# Patient Record
Sex: FEMALE | Race: WHITE | NOT HISPANIC OR LATINO | Employment: FULL TIME | URBAN - METROPOLITAN AREA
[De-identification: names, ages, dates, MRNs, and addresses within clinical notes are randomized per-mention and may not be internally consistent; named-entity substitution may affect disease eponyms.]

---

## 2020-05-12 ENCOUNTER — CONVERSION ENCOUNTER (OUTPATIENT)
Dept: CARDIOLOGY | Facility: CLINIC | Age: 40
End: 2020-05-12
Attending: SPECIALIST

## 2020-05-27 ENCOUNTER — HOSPITAL ENCOUNTER (OUTPATIENT)
Dept: OTHER | Facility: HOSPITAL | Age: 40
Discharge: HOME | End: 2020-05-27
Attending: NURSE PRACTITIONER

## 2020-05-27 LAB
CREAT BLD-MCNC: 0.9 MG/DL (ref 0.6–1.4)
GFR SERPLBLD BASED ON 1.73 SQ M-ARVRAT: >60 ML/MIN/{1.73_M2}

## 2020-06-17 ENCOUNTER — TELEMEDICINE CONVERTED (OUTPATIENT)
Dept: UROLOGY | Facility: CLINIC | Age: 40
End: 2020-06-17
Attending: UROLOGY

## 2020-06-23 ENCOUNTER — OFFICE VISIT CONVERTED (OUTPATIENT)
Dept: CARDIOLOGY | Facility: CLINIC | Age: 40
End: 2020-06-23
Attending: SPECIALIST

## 2020-06-23 ENCOUNTER — CONVERSION ENCOUNTER (OUTPATIENT)
Dept: CARDIOLOGY | Facility: CLINIC | Age: 40
End: 2020-06-23

## 2020-06-24 ENCOUNTER — HOSPITAL ENCOUNTER (OUTPATIENT)
Dept: NUCLEAR MEDICINE | Facility: HOSPITAL | Age: 40
Discharge: HOME | End: 2020-06-24
Attending: UROLOGY

## 2020-07-01 ENCOUNTER — CONVERSION ENCOUNTER (OUTPATIENT)
Dept: CARDIOLOGY | Facility: CLINIC | Age: 40
End: 2020-07-01
Attending: SPECIALIST

## 2020-07-02 ENCOUNTER — TELEPHONE CONVERTED (OUTPATIENT)
Dept: UROLOGY | Facility: CLINIC | Age: 40
End: 2020-07-02
Attending: UROLOGY

## 2021-04-27 ENCOUNTER — OFFICE VISIT CONVERTED (OUTPATIENT)
Dept: CARDIOLOGY | Facility: CLINIC | Age: 41
End: 2021-04-27
Attending: SPECIALIST

## 2021-05-10 NOTE — PROCEDURES
Procedure Note      Patient Name: Verona Meade   Patient ID: [id number]   Sex: Female   YOB: 1980    Referring Provider: Caren DEL CASTILLO    Visit Date: May 12, 2020    Provider: Christian Ma MD   Location: [facility]   Location Address: [address] [place] [address]  [id number]   Location Phone: [phone]          FINAL REPORT   HOLTER MONITOR REPORT  Date: 05/07/2020   Indication: Palpitations      The patient was monitored for a period of 24 hours.  The total number of beats was 127,277 with an average rate of 88.  The maximum beats per minute was 125 with a minimum beats per minute of 76.  There was underlying sinus rhythm, and occasional PVCs.      CONCLUSION:  24-hour Holter monitor reveals occasional PVCs with no significant tachy- or bradyarrhythmias.      MD CAS Samay[place]/jc    This note was transcribed by Melissa Prakash.  jc/CAS  The above service was transcribed by Melissa Prakash, and I attest to the accuracy of the note.  CAS                 Electronically Signed by: Stefanie Prakash-, Other -Author on May 13, 2020 07:57:48 AM  Electronically Co-signed by: Christian Ma MD -Reviewer on May 13, 2020 12:09:33 PM

## 2021-05-10 NOTE — PROCEDURES
"   Procedure Note      Patient Name: Verona Meade   Patient ID: [id number]   Sex: Female   YOB: 1980    Primary Care Provider: Caren DEL CASTILLO   Referring Provider: Caren DEL CASTILLO    Visit Date: July 1, 2020    Provider: Christian Ma MD   Location: [facility]   Location Address: [address] [place] [address], [address]   [place]  [id number]   Location Phone: [phone]          FINAL REPORT   TRANSTHORACIC ECHOCARDIOGRAM REPORT    Diagnosis: Palpitations   Height: 5'5\" Weight: 257 B/P: 112/70 BSA: 2.2   Tech: BNS   MEASUREMENTS:  RVID (Diastole) : RVID. (NORMAL: 0.7 to 2.4 cm max)   LVID (Systole): 3.4 cm (Diastole): 4.8 cm . (NORMAL: 3.7 - 5.4 cm)   Posterior Wall Thickness (Diastole): 0.8 cm. (NORMAL: 0.8 - 1.1 cm)   Septal Thickness (Diastole): 0.9 cm. (NORMAL: 0.7 - 1.2 cm)   LAID (Systole): 3.3 cm. (NORMAL: 1.9 - 3.8 cm)   Aortic Root Diameter (Diastole): 3.0 cm. (NORMAL: 2.0 - 3.7 cm)   DOPPLER:  E/A ratio 1.1 (NORMAL 0.8-2.0)   DT: 222 msec (NORMAL 140-240 msec.)   IVRT 79 m/sec (NORMAL  m/sec.)   E/E': 7 (NORMAL <8 avg.)   COMMENTS:  The patient underwent 2-D, M-Mode, and Doppler examination, including pulse-wave, continuous-wave, and color-flow analysis; the study is technically adequate. The following was observed:   FINDINGS:  AORTIC VALVE: Normal. Tricuspid in appearance with normal central closure.   MITRAL VALVE: Normal. Bicuspid in appearance.   TRICUSPID VALVE: Normal.   PULMONIC VALVE: Not well visualized.   LEFT ATRIUM: Normal. No intracavitary masses or clots seen. LA volume index is 13 mL/m2.   AORTIC ROOT: Normal in size with adequate motion.   LEFT VENTRICLE: Normal left ventricular systolic function. Ejection fraction 64%.   RIGHT ATRIUM: Normal.   RIGHT VENTRICLE: Normal size and function.   PERICARDIUM: Unremarkable. No evidence of effusion.   INFERIOR VENA CAVA: Diameter is 1.9 cm.   DOPPLER: Doppler examination of the aortic, mitral, " tricuspid, and pulmonary valves was performed. Normal pulmonary artery systolic pressure by Doppler.   Faxed: 07/02/2020      CONCLUSION:  1.  Normal left ventricular systolic function.   2.  No significant valvular abnormalities.       MD CAS [place]/jc    This note was transcribed by Melissa Prakash.  jc/CAS  The above service was transcribed by Melissa Prakash, and I attest to the accuracy of the note.  CAS                 Electronically Signed by: Stefanie Prakash-, Other -Author on July 2, 2020 11:05:00 AM  Electronically Co-signed by: Christian Ma MD -Reviewer on July 13, 2020 08:46:09 AM

## 2021-05-10 NOTE — H&P
History and Physical      Patient Name: Verona Meade   Patient ID: [id number]   Sex: Female   YOB: 1980    Primary Care Provider: Caren DEL CASTILLO   Referring Provider: Caren DEL CASTILLO    Visit Date: June 23, 2020    Provider: Christian Ma MD   Location: [facility]   Location Address: [address] [place] [address], [address]   [place]  [id number]   Location Phone: [phone]          Chief Complaint  · Palpitations       History Of Present Illness  Consult requested by: Caren DEL CASTILLO   Verona Meade is a 39 year old /White female with a history of palpitations on and off for the last few months. She has shortness of breath on exertion. A 24-hour Holter was done, which showed episodes of tachycardia. She was started on Metoprolol and feels better. No chest pain. No syncopal or presyncopal episodes.   PAST MEDICAL HISTORY: Positive for hypertension. Negative for diabetes mellitus or hyperlipidemia.   FAMILY HISTORY: Positive for diabetes and hypertension. Negative for heart disease.   PSYCHOSOCIAL HISTORY: Positive for mood changes and depression. She rarely drinks alcohol and never used tobacco.   CURRENT MEDICATIONS: include Metoprolol ER 50 mg daily; Lisinopril 10 mg daily; Cymbalta 60 mg daily. The dosage and frequency of the medications were reviewed with the patient.   ALLERGIES: No known drug allergies.   CHOLESTEROL STATUS: Unknown.       Review of Systems  · Constitutional  o Admits  o : fatigue, good general health lately  o Denies  o : recent weight changes   · Eyes  o Denies  o : double vision  · HENT  o Denies  o : hearing loss or ringing, chronic sinus problem, swollen glands in neck  · Cardiovascular  o Admits  o : palpitations (fast, fluttering, or skipping beats), shortness of breath while walking or lying flat  o Denies  o : chest pain, swelling (feet, ankles, hands)  · Respiratory  o Denies  o : chronic or frequent cough, asthma or  "wheezing, COPD  · Gastrointestinal  o Denies  o : ulcers, nausea or vomiting  · Neurologic  o Admits  o : headaches  o Denies  o : lightheaded or dizzy, stroke  · Musculoskeletal  o Denies  o : joint pain, back pain  · Endocrine  o Denies  o : thyroid disease, diabetes, heat or cold intolerance, excessive thirst or urination  · Heme-Lymph  o Denies  o : bleeding or bruising tendency, anemia      Vitals  Date Time BP Position Site L\R Cuff Size HR RR TEMP (F) WT  HT  BMI kg/m2 BSA m2 O2 Sat HC       06/23/2020 12:41 /70 Sitting    83 - R   257lbs 0oz 5'  5\" 42.77 2.31           Physical Examination  · Constitutional  o Appearance  o : Awake, alert, cooperative, pleasant.  · Eyes  o Pupils and Irises  o : Pupils equal and reacting to light and accommodation.  · Ears, Nose, Mouth and Throat  o Ears  o : Tympanic membranes are normal.  o Nose  o : Clear with no maxillary tenderness.  o Oral Cavity  o : Clear.  · Neck  o Inspection/Palpation  o : No JVD, bruits, thyromegaly, or adenopathy. Trachea midline. No axillary or cervical lymphadenopathy.  o Thyroid  o : No thyroid enlargement.   · Respiratory  o Inspection of Chest  o : No chest wall deformities, moving equal.  o Auscultation of Lungs  o : Good air entry with vesicular breath sounds.  o Percussion of Chest  o : Resonant bilaterally.   o Palpation of Chest  o : Equal movements bilaterally.  · Cardiovascular  o Heart  o :   § Auscultation of Heart  § : PMI is in the 5th intercostal space. S1 and S2 regular. No S3. No S4. No murmurs.  o Peripheral Vascular System  o :   § Extremities  § : No pedal edema. Peripheral pulses well felt. No cyanosis.  · Gastrointestinal  o Abdominal Examination  o : No organomegaly, masses, tenderness, bruits, or abnormal pulsations. Bowel sounds are normal.  · Musculoskeletal  o General  o : Muscle strength is normal with normal tone.  · Skin and Subcutaneous Tissue  o General Inspection  o : No rash, petechiae, or suspicious " skin lesions. Skin turgor is normal.          Assessment     ASSESSMENT AND PLAN:    1.  Palpitations, tachycardia:  Agree with Metoprolol ER 50 mg once a day.  Will review her 24-hour Holter.  Will       do an echocardiogram to evaluate left ventricular systolic function.  2.  Essential hypertension controlled:  Continue Lisinopril.    Christian Ma MD, [facility]  CAS/eleni           This note was transcribed by Alessandra Ashton.  eleni/CAS  The above service was transcribed by Alessandra Ashton, and I attest to the accuracy of the note.  CAS               Electronically Signed by: Alessandra Ashton-, -Author on June 25, 2020 08:41:18 AM  Electronically Co-signed by: Christian Ma MD -Reviewer on June 30, 2020 08:33:08 AM

## 2021-05-13 NOTE — PROGRESS NOTES
Progress Note      Patient Name: Verona Meade   Patient ID: [id number]   Sex: Female   YOB: 1980    Primary Care Provider: Caren DEL CASTILLO   Referring Provider: Caren DEL CASTILLO    Visit Date: 2020    Provider: Dorothea Jordan MD   Location: [facility]   Location Address: [address] [place] [address]  [id number]   Location Phone: [phone]          Chief Complaint  · Pt is here for urological concerns     Video Conferencing Visit- presents for evaluation via video conferencing via Zoom.   Verbal consent obtained before beginning visit.   The following staff were present during this visit: Beata Rivera MA.         History Of Present Illness     39-year-old lady presents for further evaluation of left atrophic kidney.  She previously sought evaluation by PCP for shortness of air and tachycardia for which chest CT was obtained.  Incidentally noted on CT chest was left renal atrophy with renal scarring.  Patient states she was unaware of size discrepancy of her kidneys.    Denies left-sided flank pain.  Denies history of pyelonephritis or urinary tract infection.  Denies having UTIs as a child.  Patient does report history of chronic back pain, but no flank pain.  Denies history of nephrolithiasis or hematuria.      creatinine  2020: 0.96  2020: 1.11    CT chest with contrast, 2020: Below the hemidiaphragms, there is chronic scarring involving left kidney.  The left kidney is also atrophic       Past Medical History  Depression with anxiety; Heart rate fast; High blood pressure         Past Surgical History   section         Medication List  Cymbalta 60 mg oral capsule,delayed release(DR/EC); lisinopril 10 mg oral tablet; metoprolol succinate 50 mg oral tablet extended release 24 hr         Allergy List  NO KNOWN DRUG ALLERGIES       Allergies Reconciled  Family Medical History  *No Known Family History         Social History  Tobacco (Former)          Review of Systems  · Constitutional  o Denies  o : chills, fever  · Eyes  o Admits  o : Glasses or contacts  · Cardiovascular  o Denies  o : chest pain on exertion  · Respiratory  o Denies  o : shortness of breath  · Gastrointestinal  o Denies  o : nausea, vomiting, diarrhea  · Genitourinary  o Denies  o : blood in urine, kidney stones, trouble voiding  · Neurologic  o Denies  o : tingling or numbness  · Musculoskeletal  o Admits  o : joint pain  · Endocrine  o Denies  o : weight gain, weight loss  · Heme-Lymph  o Denies  o : easy bleeding, easy bruising      Physical Examination  · Constitutional  o Appearance  o : Well developed, well nourished, alert, in no acute distress.  · Head and Face  o Head  o :   § Inspection  § : Normocephalic, atraumatic  o Face  o :   § Inspection  § : No facial lesions  · Skin and Subcutaneous Tissue  o General Inspection  o : No rashes, lesions or areas of discoloration present. Skin turgor is normal.  · Neurologic  o Mental Status Examination  o :   § Orientation  § : alert and oriented x 3  · Psychiatric  o Mood and Affect  o : mood normal, affect appropriate          Results     [facility]      PACS RADIOLOGY REPORT    Patient: YULIA WADE Acct: #[id number] Report: #[id number]    UNIT #: [id number]  DOS: 20 1447  INSURANCE:[place] OUT OF STATE ORDER #:[id number]  LOCATION:[facility]   : 1980    PROVIDERS  ADMITTING:   ATTENDING: XIMENA MURO  FAMILY:  NONE,MD ORDERING:  XIMENA MURO   OTHER:  DICTATING:  ARIELLE LENTZ MD    REQ #:[id number]   EXAM:CHW - CT CHEST with CONTRAST  REASON FOR EXAM:  TACHYCARDIA SHORT OF BREATH  REASON FOR VISIT:  TACHYCARDIA SHORT OF BREATH    *******Signed******     PROCEDURE: CT CHEST W/ CONTRAST     COMPARISON: None.     INDICATIONS: TACHYCARDIA SHORT OF BREATH     TECHNIQUE: After obtaining the patient's consent, CT images were obtained with non-ionic   intravenous  contrast material.       PROTOCOL:   Standard imaging protocol performed      RADIATION:   DLP: 838mGy*cm    Automated exposure control was utilized to minimize radiation dose.   CONTRAST: 95cc Isovue 370 I.V.  LABS:   eGFR: 81ml/min/1.73m2     FINDINGS:   There are 2 noncalcified pulmonary nodules located in the superior aspect of the right middle lobe   on image 34 of series 3.  The largest nodule measures 3 mm in size.  The findings are of doubtful   clinical significance.  There is a calcified nodule in the left upper lobe on image 23 of series 3   consistent with a granuloma.  The remaining lungs and pleural spaces are clear.  There are no   enlarged lymph nodes.  The heart size is normal.  There is normal vascular enhancement.  There are   no layering pleural effusions.  Below the hemidiaphragms, there is chronic scarring involving the   left kidney.  The left kidney is also atrophic.  There are no suspicious osteolytic or sclerotic   lesions within the bony thorax.     CONCLUSION:   1. Two noncalcified pulmonary nodules in the superior aspect of the right middle lobe of doubtful   clinical significance.  I would recommend follow-up based on Fleischner society guidelines.  2. Left renal atrophy and chronic scarring.        The findings include multiple, incidentally detected, solid pulmonary nodules, measuring less than   6mm.  2017 guidelines from the Fleischner Society for the follow-up and management of incidentally   detected indeterminate pulmonary nodules in persons at least 35 years of age depend on nodule size   (average length and width) and underlying risk factors (including smoking and other risk factors).   Please consider the following recommendations after clinical assessment of risk factors.  For <6mm   solid nodules: In low risk patients, no follow-up required.  If suspicious morphology or upper lobe   location, consider 12 month follow-up.  In high risk patients, optional CT in 12 months.            ARIELLE LENTZ MD         Electronically Signed and Approved By: ARIELLE LENTZ MD on 5/27/2020 at 15:47            Until signed, this is an unconfirmed preliminary report that may contain  errors and is subject to change.                WORBR:  D:05/27/20 1547>       Assessment  · Atrophy, kidney     587/N26.1    Problems Reconciled  Plan  · Orders  o Renal Scan with Lasix [facility] ([id number]) - 587/N26.1 - 06/24/2020  o CT Abdomen and Pelvis without and with Contrast UROLOGY PROTOCOL (includes delayed imaging) [facility] ([id number]) - 587/N26.1 - 06/24/2020  · Medications  o Medications have been Reconciled  o Transition of Care or Provider Policy  · Instructions  o Electronically Identified Patient Education Materials Provided Electronically     Left renal atrophynoted incidentally on CT scan.  CT imaging reviewed and discussed with patient.  CT of chest does not provide complete evaluation of the kidneys and only captures to the mid poles.  Left renal atrophy noted on this incomplete evaluation however it is of unknown etiology or significance.    Discussed with patient that this is likely chronic and it is unknown at this point if this kidney is considered functioning.    Warrants further evaluation to assess viability and anatomy of her urinary system  Provided reassurance this patient's creatinine is normal    Discussed that intervention depending on work-up may be indicated in order to preserve any residual function of the left kidney however, also discussed, that this kidney may be nonfunctioning and if remains asymptomatic then no management is warranted.    Obtain MAG3 Lasix renal scan and CT abdomen/pelvis with/without/and delayed imaging  All questions addressed    Total time for encounter greater than 30 minutes due to review of records, counseling and coordination of care which dominated greater than 51% of the encounter.               Electronically Signed by: Dorothea Jordan MD -Author on June 19, 2020 07:40:14 AM

## 2021-05-13 NOTE — PROGRESS NOTES
Progress Note      Patient Name: Verona Meade   Patient ID: [id number]   Sex: Female   YOB: 1980    Primary Care Provider: Caren DEL CASTILLO   Referring Provider: Caren DEL CASTILLO    Visit Date: July 2, 2020    Provider: Dorothea Jordan MD   Location[facility]   Location Address: [address] [place] [address]  [id number]   Location Phone: [phone]          Chief Complaint  · Pt is here for urological concerns     Telephone Visit- presents for evaluation via telephone.   Verbal consent obtained before beginning visit.   The following staff were present during this visit: Beata Rivera MA  Total time for encounter: 11 minutes       History Of Present Illness     39-year-old lady presents for further evaluation of left atrophic kidney.  She previously sought evaluation by PCP for shortness of air and tachycardia for which chest CT was obtained.  Incidentally noted on CT chest was left renal atrophy with renal scarring.  Patient states she was unaware of size discrepancy of her kidneys.    Denies left-sided flank pain.  Denies history of pyelonephritis or urinary tract infection.  Denies having UTIs as a child.  Patient does report history of chronic back pain, but no flank pain.  Denies history of nephrolithiasis or hematuria.    Update 7/2/2020: Patient presents for follow-up after CT abdomen pelvis and MAG3 Lasix renal scan.  Patient denies significant changes since last visit.  Denies any flank pain or bothersome urinary symptoms.    creatinine  5/20/2020: 0.96  5/7/2020: 1.11    CT chest with contrast, 5/27/2020: Below the hemidiaphragms, there is chronic scarring involving left kidney.  The left kidney is also atrophic    CT abdomen pelvis, 6/24/2020: Moderate left renal cortical scarring.  Kidneys have symmetric enhancement and excretion no hydronephrosis.    MAG3 scan, 6/24/2020: Differential function left 40%: Right 60%; no evidence of obstructive uropathy           Past  Medical History  Atrophy, kidney; Depression with anxiety; Heart rate fast; High blood pressure; Renal scarring         Past Surgical History   section         Medication List  Cymbalta 60 mg oral capsule,delayed release(DR/EC); lisinopril 10 mg oral tablet; metoprolol succinate 50 mg oral tablet extended release 24 hr         Allergy List  NO KNOWN DRUG ALLERGIES       Allergies Reconciled  Family Medical History  *No Known Family History         Social History  Tobacco (Former)         Review of Systems  · Constitutional  o Denies  o : chills, fever  · Gastrointestinal  o Denies  o : nausea, vomiting, flank pain          Results     [facility]      PACS RADIOLOGY REPORT    Patient: YULIA WADE Acct: #[id number] Report: #[id number]    UNIT #: [id number]  DOS: 20 1308  INSURANCE:[employer] OUT OF STATE ORDER #:[id number]  LOCATION:NM   : 1980    PROVIDERS  ADMITTING:   ATTENDING: SIMON MIMS  FAMILY:  XIMENA MURO ORDERING:  SIMON MIMS   OTHER:  DICTATING:  Reyes Pang MD, IV    REQ #:[id number]   EXAM:ABDPELWOW - CT ABDOMEN PELVIS wwo CONTRAST  REASON FOR EXAM:  ATROPHY OF KIDNEY  REASON FOR VISIT:  ATROPHY OF KIDNEY    *******Signed******     PROCEDURE: CT ABDOMEN PELVIS WITHOUT AND WITH CONTRAST     COMPARISON: None.     INDICATIONS: ATROPHY OF KIDNEY     PROTOCOL:   Urology imaging protocol performed      RADIATION:   DLP: 3626mGy*cm    Automated exposure control was utilized to minimize radiation dose.   CONTRAST: 100cc Isovue 370 I.V.     TECHNIQUE: Axial images of the abdomen and pelvis with and without intravenous contrast. Oral   contrast was not administered.     FINDINGS:     ABDOMEN:  The lung bases are clear.  The liver, spleen, pancreas and adrenal glands are normal.  No renal   calcifications are identified.  There are no inflammatory changes around the gallbladder.  There is   moderate left renal cortical  "scarring.  The right kidney is normal.  No evidence of suspicious   renal mass or hydronephrosis.  The kidneys have symmetric enhancement and excretion.     PELVIS:  No ureteral or urinary bladder calcifications are identified.  The partially opacified urinary   bladder is normal.  The ureters have a normal course and caliber.  The uterus and adnexa have a   normal appearance.  The appendix and terminal ileum are normal.  No CT evidence of colitis.  The   abdominal aorta has a normal caliber.     IMPRESSION:  Moderate left renal cortical scarring.     JACKSON BAUTISTA MD         Electronically Signed and Approved By: JACKSON BAUTISTA MD on 2020 at 15:21            Until signed, this is an unconfirmed preliminary report that may contain  errors and is subject to change.                BRYJE:  D:20 1521        20 min.).      T 1/2 POST-LASIX (RIGHT): 0.87 minutes.  (Normal  20   min.).    BLADDER: Normal.    OTHER: Negative.       CONCLUSION: Some asymmetrically decreased left renal function, but no evidence of obstructive   uropathy.            JACKIE JONES MD         Electronically Signed and Approved By: JACKIE JONES MD on 2020 at 15:02            Until signed, this is an unconfirmed preliminary report that may contain  errors and is subject to change.                RODD1:  D:20 1502  \">[facility]      PACS RADIOLOGY REPORT    Patient: YULIA WADE Acct: #[id number] Report: #[id number]    UNIT #: [id number]  DOS: 20 1308  INSURANCE:[employer] OUT OF STATE ORDER #:[id number]  LOCATION:NM   : 1980    PROVIDERS  ADMITTING:   ATTENDING: SIMON MIMS  FAMILY:  XIMENA MURO ORDERING:  SIMON MIMS   OTHER:  DICTATING:  Jackie oJnes MD    REQ #:[id number]   EXAM:RENSL - RENAL SCAN With LASIX  REASON FOR EXAM:  ATROPHY OF KIDNEY  REASON FOR VISIT:  ATROPHY OF KIDNEY    *******Signed******     PROCEDURE: RENAL " WITH LASIX     COMPARISON: [facility], CT, ABDOMEN PELVIS W/WO CONTRAST, 6/24/2020, 14:25.    [facility], CT, CHEST W/ CONTRAST, 5/27/2020, 14:54.     INDICATIONS: ATROPHY OF KIDNEY     TECHNIQUE: After obtaining the patient's consent, a bolus intravenous injection of Tc-99m MAG-3 was   administered.  Sequential renal flow images were performed followed by sequential static images.    Quantitative analysis was performed of both kidneys. Subsequently, the patient was given   intravenous Lasix for determination of renal washout.    RADIONUCLIDE:         10.7 MCI    Tc99m Mag3 - I.V.     FINDINGS:   ARTERIAL PERFUSION: Normal and symmetric.    TIME TO PEAK (LEFT): Normal-less than five minutes.    TIME TO PEAK (RIGHT): Normal-less than five minutes.    UPTAKE/FUNCTION: Left 40 % : Right 60 %.    T 1/2 POST-LASIX (LEFT): 0.79 minutes.  (Normal < 10 min, equivocal 10-20 min, abnormal > 20 min.).      T 1/2 POST-LASIX (RIGHT): 0.87 minutes.  (Normal < 10 min, equivocal 10-20 min, abnormal > 20   min.).    BLADDER: Normal.    OTHER: Negative.       CONCLUSION: Some asymmetrically decreased left renal function, but no evidence of obstructive   uropathy.            JACKIE MCDONALD MD         Electronically Signed and Approved By: JACKIE MCDONALD MD on 6/24/2020 at 15:02            Until signed, this is an unconfirmed preliminary report that may contain  errors and is subject to change.                RODD1:  D:06/24/20 1502         Assessment  · Atrophy, kidney     587/N26.1  · Renal scarring     593.89/N28.89    Problems Reconciled  Plan  · Orders  o Physician Telephone Evaluation, 11-20 minutes (02928) - 587/N26.1, 593.89/N28.89 - 07/02/2020  · Medications  o Medications have been Reconciled  o Transition of Care or Provider Policy  · Instructions  o Electronically Identified Patient Education Materials Provided Electronically     Left renal atrophy with moderate renal scarringCT imaging  of MAG3 and reviewed and discussed with patient.  Despite left renal atrophy and scarring, it is a functioning kidney at 40%.  Has symmetric uptake of contrast on CT scan and is without evidence of obstructive uropathy.  No known etiology for the moderate renal scarring.  No acute intervention is warranted given these findings.     Obtain surveillance imaging via renal ultrasound in 1 year to assess for further atrophy    Follow-up 1 year with renal ultrasound prior  All questions addressed               Electronically Signed by: Dorothea Jordan MD -Author on July 2, 2020 07:00:41 PM

## 2021-05-14 VITALS
BODY MASS INDEX: 45.32 KG/M2 | HEIGHT: 65 IN | DIASTOLIC BLOOD PRESSURE: 74 MMHG | WEIGHT: 272 LBS | SYSTOLIC BLOOD PRESSURE: 118 MMHG | HEART RATE: 90 BPM

## 2021-05-14 NOTE — PROGRESS NOTES
"   Progress Note      Patient Name: Verona Meade   Patient ID: [id number]   Sex: Female   YOB: 1980    Primary Care Provider: Caren DEL CASTILLO   Referring Provider: Caren DEL CASTILLO    Visit Date: April 27, 2021    Provider: Christian Ma MD   Location: [facility]   Location Address: [address] [place] [address], [address]   [place]  [id number]   Location Phone: [phone]          Chief Complaint     Palpitations.       History Of Present Illness  Verona Meade is a 40 year old /White female with a history of palpitations. She has had some increased palpitations, mostly at night time, going on for the last few months. No syncopal or pre-syncopal episodes. No chest pain.   CURRENT MEDICATIONS: Medications have been reviewed and are as stated.   PAST MEDICAL HISTORY: Positive for hypertension. Negative for diabetes mellitus or hyperlipidemia.   PSYCHOSOCIAL HISTORY: Rarely consumes alcohol. Denies tobacco use.      ALLERGIES:  No known drug allergies.       Review of Systems  · Cardiovascular  o Admits  o : palpitations (fast, fluttering, or skipping beats), shortness of breath while walking or lying flat  o Denies  o : swelling (feet, ankles, hands), chest pain or angina pectoris   · Respiratory  o Denies  o : chronic or frequent cough      Vitals  Date Time BP Position Site L\R Cuff Size HR RR TEMP (F) WT  HT  BMI kg/m2 BSA m2 O2 Sat FR L/min FiO2 HC       04/27/2021 09:46 /74 Sitting    90 - R   272lbs 0oz 5'  5\" 45.26 2.38             Physical Examination  · Constitutional  o Appearance  o : Awake, alert, cooperative, pleasant.  · Neck  o Inspection/Palpation  o : No JVD.  · Respiratory  o Inspection of Chest  o : No chest wall deformities, moving equal.  o Auscultation of Lungs  o : Clear.  · Cardiovascular  o Heart  o :   § Auscultation of Heart  § : S1 and S2 regular. No S3. No S4.   o Peripheral Vascular System  o :   § Extremities  § : No edema. "   · Gastrointestinal  o Abdominal Examination  o : No masses or organomegaly noted.          Assessment     IMPRESSION AND PLAN:  1. Palpitations and tachycardia.  In view of her increased palpitations, will increase Metoprolol ER to 50 mg b.i.d.   2. Essential hypertension, controlled.  Continue on Lisinopril.  3. See me back in six months.      MD CAS [place]/lm                Electronically Signed by: Lisa Joseph-, Other -Author on April 29, 2021 01:20:08 PM  Electronically Co-signed by: Christian Ma MD -Reviewer on April 30, 2021 11:27:43 AM

## 2021-05-15 VITALS
BODY MASS INDEX: 42.82 KG/M2 | DIASTOLIC BLOOD PRESSURE: 70 MMHG | HEART RATE: 83 BPM | WEIGHT: 257 LBS | HEIGHT: 65 IN | SYSTOLIC BLOOD PRESSURE: 112 MMHG

## 2021-07-16 ENCOUNTER — TRANSCRIBE ORDERS (OUTPATIENT)
Dept: ADMINISTRATIVE | Facility: HOSPITAL | Age: 41
End: 2021-07-16

## 2021-07-16 DIAGNOSIS — R91.1 PULMONARY NODULE: ICD-10-CM

## 2021-07-16 DIAGNOSIS — R10.2 PELVIC PAIN: ICD-10-CM

## 2021-07-16 DIAGNOSIS — Z12.39 SCREENING BREAST EXAMINATION: Primary | ICD-10-CM

## 2021-07-23 ENCOUNTER — HOSPITAL ENCOUNTER (OUTPATIENT)
Dept: ULTRASOUND IMAGING | Facility: HOSPITAL | Age: 41
Discharge: HOME | End: 2021-07-23

## 2021-07-23 ENCOUNTER — HOSPITAL ENCOUNTER (OUTPATIENT)
Dept: CT IMAGING | Facility: HOSPITAL | Age: 41
Discharge: HOME | End: 2021-07-23

## 2021-07-23 DIAGNOSIS — R91.1 PULMONARY NODULE: ICD-10-CM

## 2021-07-23 DIAGNOSIS — R10.2 PELVIC PAIN: ICD-10-CM

## 2021-07-23 PROCEDURE — 71250 CT THORAX DX C-: CPT

## 2021-07-23 PROCEDURE — 76856 US EXAM PELVIC COMPLETE: CPT

## 2021-07-23 PROCEDURE — 71250 CT THORAX DX C-: CPT | Performed by: RADIOLOGY

## 2021-08-02 ENCOUNTER — HOSPITAL ENCOUNTER (OUTPATIENT)
Dept: MAMMOGRAPHY | Facility: HOSPITAL | Age: 41
Discharge: HOME | End: 2021-08-02
Admitting: NURSE PRACTITIONER

## 2021-08-02 DIAGNOSIS — Z12.39 SCREENING BREAST EXAMINATION: ICD-10-CM

## 2021-08-02 PROCEDURE — 77067 SCR MAMMO BI INCL CAD: CPT

## 2021-11-22 PROBLEM — I10 HYPERTENSION, ESSENTIAL: Status: ACTIVE | Noted: 2021-11-22

## 2021-11-22 PROBLEM — R00.2 PALPITATIONS: Status: ACTIVE | Noted: 2021-11-22

## 2021-11-22 NOTE — PROGRESS NOTES
[facility]  Cardiology progress Note    Patient Name: Verona Meade  : 1980    CHIEF COMPLAINT  Palpitations.      Subjective   Subjective     HISTORY OF PRESENT ILLNESS    Verona Meade is a 41 y.o. female with history of palpitations and tachycardia.  No palpitations.    Review of Systems:   Constitutional no fever,  no weight loss   Skin no rash   Otolaryngeal no difficulty swallowing   Cardiovascular See HPI   Pulmonary no cough, no sputum production   Gastrointestinal no constipation, no diarrhea   Genitourinary no dysuria, no hematuria   Hematologic no easy bruisability, no abnormal bleeding   Musculoskeletal no muscle pain   Neurologic no dizziness, no falls         Personal History     Social History:  reports that she quit smoking about 20 years ago. Her smoking use included cigarettes. She started smoking about 20 years ago. She smoked 0.50 packs per day. She has never used smokeless tobacco. She reports current alcohol use. She reports that she does not use drugs.    Home Medications:  Current Outpatient Medications on File Prior to Visit   Medication Sig   • buPROPion XL (WELLBUTRIN XL) 150 MG 24 hr tablet Take 150 mg by mouth Daily.   • DULoxetine (CYMBALTA) 60 MG capsule One po qod   • lisinopril (PRINIVIL,ZESTRIL) 10 MG tablet Take 10 mg by mouth Daily.   • [DISCONTINUED] metoprolol succinate XL (TOPROL-XL) 50 MG 24 hr tablet Take 50 mg by mouth 2 (Two) Times a Day.     No current facility-administered medications on file prior to visit.     Allergies:  No Known Allergies    Objective    Objective       Vitals:   Heart Rate:  [76] 76  BP: (114)/(75) 114/75  Body mass index is 45.43 kg/m².     Physical Exam:   Constitutional: Awake, alert, No acute distress    Eyes: PERRLA, sclerae anicteric, no conjunctival injection   HENT: NCAT, mucous membranes moist   Neck: Supple, no thyromegaly, no lymphadenopathy, trachea midline   Respiratory: Clear to auscultation bilaterally,  nonlabored respirations    Cardiovascular: RRR, no murmurs or rubs. Palpable pedal pulses bilaterally   Musculoskeletal: No bilateral ankle edema, no cyanosis to extremities   Psychiatric: Appropriate affect, cooperative   Neurologic: Oriented x 3, strength symmetric in all extremities, Cranial Nerves grossly intact to confrontation, speech clear   Skin: No rashes.    Result Review    Result Review:  I have personally reviewed the available results from  [x]  Laboratory  [x]  EKG  [x]  Cardiology  [x]  Medications  [x]  Old records  []  Other:     ECG 12 Lead    Date/Time: 11/23/2021 12:51 PM  Performed by: Christian Ma MD  Authorized by: Christian Ma MD   Comparison: compared with previous ECG   Similar to previous ECG  Rhythm: sinus rhythm  Rate: normal  QRS axis: normal    Clinical impression: normal ECG  Comments: Normal sinus rhythm.  No significant changes compared to previous EKG.            Impression/Plan:  1.  Palpitations/tachycardia: Continue Toprol-XL 50 mg twice daily.  2.  Essential hypertension controlled: Continue lisinopril 10 mg once a day.  Blood pressure well controlled at home.           Christian Ma MD   11/23/21   12:51 EST

## 2021-11-23 ENCOUNTER — OFFICE VISIT (OUTPATIENT)
Dept: CARDIOLOGY | Facility: CLINIC | Age: 41
End: 2021-11-23

## 2021-11-23 VITALS
BODY MASS INDEX: 45.48 KG/M2 | WEIGHT: 273 LBS | DIASTOLIC BLOOD PRESSURE: 75 MMHG | HEART RATE: 76 BPM | HEIGHT: 65 IN | SYSTOLIC BLOOD PRESSURE: 114 MMHG

## 2021-11-23 DIAGNOSIS — R00.2 PALPITATIONS: Primary | ICD-10-CM

## 2021-11-23 DIAGNOSIS — I10 HYPERTENSION, ESSENTIAL: ICD-10-CM

## 2021-11-23 PROCEDURE — 99213 OFFICE O/P EST LOW 20 MIN: CPT | Performed by: SPECIALIST

## 2021-11-23 PROCEDURE — 93000 ELECTROCARDIOGRAM COMPLETE: CPT | Performed by: SPECIALIST

## 2021-11-23 RX ORDER — LISINOPRIL 10 MG/1
10 TABLET ORAL DAILY
COMMUNITY
Start: 2021-11-07

## 2021-11-23 RX ORDER — METOPROLOL SUCCINATE 50 MG/1
50 TABLET, EXTENDED RELEASE ORAL 2 TIMES DAILY
Qty: 180 TABLET | Refills: 6 | Status: SHIPPED | OUTPATIENT
Start: 2021-11-23 | End: 2021-11-30

## 2021-11-23 RX ORDER — BUPROPION HYDROCHLORIDE 150 MG/1
150 TABLET ORAL DAILY
COMMUNITY
Start: 2021-11-15 | End: 2022-07-13

## 2021-11-23 RX ORDER — METOPROLOL SUCCINATE 50 MG/1
50 TABLET, EXTENDED RELEASE ORAL 2 TIMES DAILY
COMMUNITY
Start: 2021-11-07 | End: 2021-11-23 | Stop reason: SDUPTHER

## 2021-11-23 RX ORDER — DULOXETIN HYDROCHLORIDE 60 MG/1
CAPSULE, DELAYED RELEASE ORAL
COMMUNITY
Start: 2021-11-07 | End: 2022-07-13

## 2021-11-30 RX ORDER — METOPROLOL SUCCINATE 50 MG/1
TABLET, EXTENDED RELEASE ORAL
Qty: 180 TABLET | Refills: 3 | Status: SHIPPED | OUTPATIENT
Start: 2021-11-30 | End: 2022-07-13

## 2022-03-28 DIAGNOSIS — N28.89 RENAL SCARRING: ICD-10-CM

## 2022-03-28 DIAGNOSIS — N26.1 ATROPHY, KIDNEY: Primary | ICD-10-CM

## 2022-03-28 PROBLEM — F41.8 DEPRESSION WITH ANXIETY: Status: ACTIVE | Noted: 2022-03-28

## 2022-03-28 PROBLEM — R00.0 HEART RATE FAST: Status: ACTIVE | Noted: 2022-03-28

## 2022-04-06 ENCOUNTER — TELEPHONE (OUTPATIENT)
Dept: UROLOGY | Facility: CLINIC | Age: 42
End: 2022-04-06

## 2022-04-06 NOTE — TELEPHONE ENCOUNTER
spoke toVicki, in scheduling and got patient an appointment for DAVONTE for 4/15/22 at [facility], [address] [place] [address] , [place], [address]. patient must go with a full bladder. will send to scheduling to get them to call patient with follow up. Will have scheduling give information to patient about davonte when calling to schedule follow up.

## 2022-04-06 NOTE — TELEPHONE ENCOUNTER
----- Message from Alex Sim sent at 4/6/2022  2:51 PM EDT -----  PER PT SHE SAID SHE SPOKE WITH SOMEONE THIS MORNING, NOT SURE WHO AND LET THEM KNOW SHE WILL NOT HAVE INSURANCE FOR ABOUT 1 MONTH SHE SAID THAT SHE WOULD CALL BACK ONCE SHE GETS IT BACK TO SCHEDULE THE US AND F/U.    ----- Message -----  From: Taryn Reyes LPN  Sent: 4/6/2022   2:42 PM EDT  To: Alex Read    got patient an appointment for ANDREW for 4/15/22 at [facility], [address] [place] [address] , [place], [address]. patient must go with a full bladder. give information to patient about andrew when calling to schedule follow up. Please and thank you.

## 2022-04-15 ENCOUNTER — APPOINTMENT (OUTPATIENT)
Dept: ULTRASOUND IMAGING | Facility: HOSPITAL | Age: 42
End: 2022-04-15

## 2022-07-13 ENCOUNTER — OFFICE VISIT (OUTPATIENT)
Dept: CARDIOLOGY | Facility: CLINIC | Age: 42
End: 2022-07-13

## 2022-07-13 VITALS
BODY MASS INDEX: 41.79 KG/M2 | SYSTOLIC BLOOD PRESSURE: 113 MMHG | HEART RATE: 72 BPM | DIASTOLIC BLOOD PRESSURE: 85 MMHG | WEIGHT: 250.8 LBS | HEIGHT: 65 IN

## 2022-07-13 DIAGNOSIS — R42 DIZZINESS: ICD-10-CM

## 2022-07-13 DIAGNOSIS — R00.2 PALPITATIONS: Primary | ICD-10-CM

## 2022-07-13 DIAGNOSIS — I10 HYPERTENSION, ESSENTIAL: ICD-10-CM

## 2022-07-13 PROBLEM — R00.0 HEART RATE FAST: Status: RESOLVED | Noted: 2022-03-28 | Resolved: 2022-07-13

## 2022-07-13 PROCEDURE — 93000 ELECTROCARDIOGRAM COMPLETE: CPT | Performed by: NURSE PRACTITIONER

## 2022-07-13 PROCEDURE — 99214 OFFICE O/P EST MOD 30 MIN: CPT | Performed by: NURSE PRACTITIONER

## 2022-07-13 RX ORDER — METOPROLOL TARTRATE 50 MG/1
TABLET, FILM COATED ORAL
Qty: 245 TABLET | Refills: 3 | Status: SHIPPED | OUTPATIENT
Start: 2022-07-13 | End: 2023-02-21 | Stop reason: DRUGHIGH

## 2022-07-13 RX ORDER — TRAZODONE HYDROCHLORIDE 50 MG/1
50 TABLET ORAL NIGHTLY
COMMUNITY
Start: 2022-05-20 | End: 2023-02-19

## 2022-07-13 RX ORDER — DESVENLAFAXINE SUCCINATE 50 MG/1
50 TABLET, EXTENDED RELEASE ORAL DAILY
COMMUNITY
Start: 2022-06-12 | End: 2023-02-19

## 2022-07-13 RX ORDER — DESVENLAFAXINE 25 MG/1
25 TABLET, EXTENDED RELEASE ORAL DAILY
COMMUNITY
Start: 2022-06-12 | End: 2023-02-19

## 2022-07-13 RX ORDER — HYDROXYZINE HYDROCHLORIDE 25 MG/1
25 TABLET, FILM COATED ORAL DAILY
COMMUNITY
Start: 2022-05-20 | End: 2023-02-19

## 2022-07-13 NOTE — PROGRESS NOTES
Chief Complaint  Hypertension, Palpitations, and Dizziness    Subjective            History of Present Illness  Karishma Meade is a 42-year-old white/ female patient who presents to the office today for follow-up.  She has hypertension and palpitations.  She reports compliance with her medications.  She reports that she occasionally has increased palpitations with associated lightheadedness and dizziness approximately 1-2 times a month.  She denies any chest pain, shortness of breath, or edema.    PMH  Past Medical History:   Diagnosis Date   • Deep vein thrombosis (HCC)    • Hypertension          ALLERGY  No Known Allergies       SURGICALHX  History reviewed. No pertinent surgical history.       SOC  Social History     Socioeconomic History   • Marital status:    Tobacco Use   • Smoking status: Former Smoker     Packs/day: 0.50     Types: Cigarettes     Start date:      Quit date: 2001     Years since quittin.6   • Smokeless tobacco: Never Used   Vaping Use   • Vaping Use: Never used   Substance and Sexual Activity   • Alcohol use: Yes     Comment: occassionally   • Drug use: Never   • Sexual activity: Defer         FAMHX  Family History   Problem Relation Age of Onset   • Thyroid disease Mother    • Thyroid disease Father    • Diabetes Father    • Anemia Father    • Heart disease Paternal Grandmother    • Cancer Paternal Grandmother    • Heart disease Paternal Grandfather    • Cancer Paternal Grandfather           MEDSIGONLY  Current Outpatient Medications on File Prior to Visit   Medication Sig   • desvenlafaxine (PRISTIQ) 50 MG 24 hr tablet 50 mg Daily.   • Desvenlafaxine Succinate ER 25 MG tablet sustained-release 24 hour 25 mg Daily.   • hydrOXYzine (ATARAX) 25 MG tablet 25 mg Daily.   • lisinopril (PRINIVIL,ZESTRIL) 10 MG tablet Take 10 mg by mouth Daily.   • metoprolol succinate XL (TOPROL-XL) 50 MG 24 hr tablet TAKE 1 TABLET BY MOUTH TWICE DAILY   • traZODone (DESYREL) 50 MG  "tablet 50 mg Every Night.   • [DISCONTINUED] buPROPion XL (WELLBUTRIN XL) 150 MG 24 hr tablet Take 150 mg by mouth Daily.   • [DISCONTINUED] DULoxetine (CYMBALTA) 60 MG capsule One po qod     No current facility-administered medications on file prior to visit.         Objective   /85   Pulse 72   Ht 165.1 cm (65\")   Wt 114 kg (250 lb 12.8 oz)   BMI 41.74 kg/m²       Physical Exam  Constitutional:       Appearance: She is obese.   HENT:      Head: Normocephalic.   Neck:      Vascular: No carotid bruit.   Cardiovascular:      Rate and Rhythm: Normal rate and regular rhythm.      Pulses: Normal pulses.      Heart sounds: Normal heart sounds. No murmur heard.  Pulmonary:      Effort: Pulmonary effort is normal.      Breath sounds: Normal breath sounds.   Musculoskeletal:      Cervical back: Neck supple.      Right lower leg: No edema.      Left lower leg: No edema.   Skin:     General: Skin is dry.      Capillary Refill: Capillary refill takes less than 2 seconds.   Neurological:      Mental Status: She is alert and oriented to person, place, and time.   Psychiatric:         Behavior: Behavior normal.       ECG 12 Lead    Date/Time: 7/13/2022 3:05 PM  Performed by: Niyah Gee APRN  Authorized by: Niyah Gee APRN   Comparison: compared with previous ECG   Similar to previous ECG  Rhythm: sinus rhythm  Ectopy: atrial premature contractions  Rate: normal  BPM: 72  Conduction: conduction normal  ST Segments: ST segments normal  T Waves: T waves normal  QRS axis: normal  Other: no other findings    Clinical impression: normal ECG          Result Review :   The following data was reviewed by: ABUNDIO Leggett on 07/13/2022:  No results found for: PROBNP       No results found for: TSH   No results found for: FREET4   No results found for: DDIMERQUANT  No results found for: MG   No results found for: DIGOXIN   No results found for: TROPONINT        07/01/2020 echo:  1.  Normal left " ventricular systolic function.   2.  No significant valvular abnormalities.       05/12/2020 holter:  24-hour Holter monitor reveals occasional PVCs with no significant tachy- or bradyarrhythmias.     Assessment and Plan    Diagnoses and all orders for this visit:    1. Palpitations (Primary)  She had previous 24-hour monitor that only showed occasional PVCs.  Instructed patient to limit her caffeine intake.  Change her metoprolol dose to 50 mg 2 times a day and extra 25 mg dose nightly as needed for increased palpitations or elevated heart rate.    2. Hypertension, essential  Currently controlled and without adverse effect from medication, continue lisinopril 10 mg daily.    3. Dizziness  New development of dizziness which is pretty infrequent in nature, only occurring monthly.  Obtain duplex carotid ultrasound to assess for carotid stenosis.  -     Duplex Carotid Ultrasound CAR; Future    Other orders  -     metoprolol tartrate (LOPRESSOR) 50 MG tablet; Take 1 tablet by mouth 2 (Two) Times a Day. May also take 0.5 tablets At Night As Needed (palpitations/elevated heart rate).  Dispense: 245 tablet; Refill: 3  -     ECG 12 Lead            Follow Up   Return in about 6 months (around 1/13/2023) for Follow up with Dr Ma.    Patient was given instructions and counseling regarding her condition or for health maintenance advice. Please see specific information pulled into the AVS if appropriate.     Karishma JANAY Meade  reports that she quit smoking about 20 years ago. Her smoking use included cigarettes. She started smoking about 21 years ago. She smoked 0.50 packs per day. She has never used smokeless tobacco.           Niyah Gee, ABUNDIO  07/13/22  15:13 EDT    Dictated Utilizing Dragon Dictation

## 2022-08-30 ENCOUNTER — TELEPHONE (OUTPATIENT)
Dept: CARDIOLOGY | Facility: CLINIC | Age: 42
End: 2022-08-30

## 2022-08-30 NOTE — TELEPHONE ENCOUNTER
----- Message from ABUNDIO Zhao sent at 8/30/2022  1:18 PM EDT -----  Notify pt carotid result: Carotid Doppler examination shows bilateral plaquing.  There is no significant carotid stenosis.

## 2023-02-19 NOTE — PROGRESS NOTES
[facility]  Cardiology progress Note    Patient Name: Karishma Meade  : 1980    CHIEF COMPLAINT  Palpitations        Subjective   Subjective     HISTORY OF PRESENT ILLNESS    Karishma Meade is a 42 y.o. female with history of palpitations hypertension.  No further palpitations.    REVIEW OF SYSTEMS    Constitutional:    No fever, no weight loss  Skin:     No rash  Otolaryngeal:    No difficulty swallowing  Cardiovascular: See HPI.  Pulmonary:    No cough, no sputum production    Personal History     Social History:    reports that she quit smoking about 21 years ago. Her smoking use included cigarettes. She started smoking about 22 years ago. She has a 0.50 pack-year smoking history. She has never used smokeless tobacco. She reports current alcohol use of about 1.0 standard drink per week. She reports that she does not use drugs.    Home Medications:  Current Outpatient Medications on File Prior to Visit   Medication Sig   • lisinopril (PRINIVIL,ZESTRIL) 10 MG tablet Take 10 mg by mouth Daily.   • pantoprazole (PROTONIX) 40 MG EC tablet Take 1 tablet by mouth Daily.   • [DISCONTINUED] metoprolol succinate XL (TOPROL-XL) 50 MG 24 hr tablet Take 1 tablet by mouth 2 (Two) Times a Day.   • [DISCONTINUED] metoprolol tartrate (LOPRESSOR) 50 MG tablet Take 1 tablet by mouth 2 (Two) Times a Day. May also take 0.5 tablets At Night As Needed (palpitations/elevated heart rate).     No current facility-administered medications on file prior to visit.       Past Medical History:   Diagnosis Date   • Deep vein thrombosis (HCC)    • Hypertension        Allergies:  No Known Allergies    Objective    Objective       Vitals:   Heart Rate:  [74] 74  BP: (102)/(72) 102/72  Body mass index is 39.11 kg/m².     PHYSICAL EXAM:    General Appearance:   · well developed  · well nourished  HENT:   · oropharynx moist  · lips not cyanotic  Neck:  · thyroid not enlarged  · supple  Respiratory:  · no respiratory  distress  · normal breath sounds  · no rales  Cardiovascular:  · no jugular venous distention  · regular rhythm  · apical impulse normal  · S1 normal, S2 normal  · no S3, no S4   · no murmur  · no rub, no thrill  · carotid pulses normal; no bruit  · pedal pulses normal  · lower extremity edema: none    Skin:   · warm, dry  Psychiatric:  · judgement and insight appropriate  · normal mood and affect        Result Review:  I have personally reviewed the available results from  [x]  Laboratory  [x]  EKG  [x]  Cardiology  [x]  Medications  [x]  Old records  []  Other:     Procedures     Impression/Plan:  1.  Essential hypertension controlled: Continue lisinopril 10 mg once a day.  Monitor blood pressure regularly.  2.  Palpitations/tachycardia: Continue Toprol-XL 50 mg twice a day.  No further palpitations.           Christian Ma MD   02/21/23   14:32 EST

## 2023-02-21 ENCOUNTER — OFFICE VISIT (OUTPATIENT)
Dept: CARDIOLOGY | Facility: CLINIC | Age: 43
End: 2023-02-21
Payer: COMMERCIAL

## 2023-02-21 VITALS
HEART RATE: 74 BPM | BODY MASS INDEX: 39.15 KG/M2 | WEIGHT: 235 LBS | DIASTOLIC BLOOD PRESSURE: 72 MMHG | HEIGHT: 65 IN | SYSTOLIC BLOOD PRESSURE: 102 MMHG

## 2023-02-21 DIAGNOSIS — I10 HYPERTENSION, ESSENTIAL: Primary | ICD-10-CM

## 2023-02-21 DIAGNOSIS — R00.2 PALPITATIONS: ICD-10-CM

## 2023-02-21 PROCEDURE — 99213 OFFICE O/P EST LOW 20 MIN: CPT | Performed by: SPECIALIST

## 2023-02-21 RX ORDER — PANTOPRAZOLE SODIUM 40 MG/1
1 TABLET, DELAYED RELEASE ORAL DAILY
COMMUNITY
Start: 2023-01-27

## 2023-02-21 RX ORDER — METOPROLOL SUCCINATE 50 MG/1
1 TABLET, EXTENDED RELEASE ORAL 2 TIMES DAILY
COMMUNITY
Start: 2023-01-27 | End: 2023-02-21 | Stop reason: SDUPTHER

## 2023-02-21 RX ORDER — METOPROLOL SUCCINATE 50 MG/1
50 TABLET, EXTENDED RELEASE ORAL 2 TIMES DAILY
Qty: 90 TABLET | Refills: 6 | Status: SHIPPED | OUTPATIENT
Start: 2023-02-21

## 2023-03-29 ENCOUNTER — TRANSCRIBE ORDERS (OUTPATIENT)
Dept: ADMINISTRATIVE | Facility: HOSPITAL | Age: 43
End: 2023-03-29
Payer: COMMERCIAL

## 2023-03-29 DIAGNOSIS — Z12.31 SCREENING MAMMOGRAM FOR BREAST CANCER: Primary | ICD-10-CM

## 2023-04-03 ENCOUNTER — TRANSCRIBE ORDERS (OUTPATIENT)
Dept: ADMINISTRATIVE | Facility: HOSPITAL | Age: 43
End: 2023-04-03
Payer: COMMERCIAL

## 2023-04-03 DIAGNOSIS — N26.1 ATROPHY OF KIDNEY: Primary | ICD-10-CM

## 2023-04-19 ENCOUNTER — HOSPITAL ENCOUNTER (OUTPATIENT)
Dept: ULTRASOUND IMAGING | Facility: HOSPITAL | Age: 43
Discharge: HOME | End: 2023-04-19
Admitting: FAMILY MEDICINE
Payer: COMMERCIAL

## 2023-04-19 DIAGNOSIS — N26.1 ATROPHY OF KIDNEY: ICD-10-CM

## 2023-04-19 PROCEDURE — 76775 US EXAM ABDO BACK WALL LIM: CPT

## 2023-05-03 ENCOUNTER — HOSPITAL ENCOUNTER (OUTPATIENT)
Dept: MAMMOGRAPHY | Facility: HOSPITAL | Age: 43
Discharge: HOME | End: 2023-05-03
Admitting: FAMILY MEDICINE
Payer: COMMERCIAL

## 2023-05-03 DIAGNOSIS — Z12.31 SCREENING MAMMOGRAM FOR BREAST CANCER: ICD-10-CM

## 2023-05-03 PROCEDURE — 77067 SCR MAMMO BI INCL CAD: CPT

## 2023-05-03 PROCEDURE — 77063 BREAST TOMOSYNTHESIS BI: CPT

## 2023-05-03 NOTE — PROGRESS NOTES
Chief Complaint: kidney Atrohy    Subjective         History of Present Illness  Karishma Meade is a 42 y.o. female presents to [facility] to be seen for kidney atrophy.    Patient was previously seen by Dr. Dorothea Flores with last visit being 7/2/2020 for atrophic left kidney.    Patient reports that her original finding of atrophic kidney was found incidentally during a chest CT.     Denies recurrent UTI.     Frequency- admits- but reports she has recently increased her water intake    Urgency- admits    Incontinence- with urgency, some with cough/laugh/sneeze    Nocturia- 3-4    GH- denies     surgeries- denies    Family history of  malignancy- denies    Cardiopulmonary- HTN, irregular heartbeat    Anticoagulants- denies    Smoker- denies      Renal ultrasound:  INDICATIONS:  RENAL ATROPHY     FINDINGS:          The right kidney measures about 11.7 centimeters in length.  Left kidney measures 9.7 centimeters   in length.  Right kidney demonstrates no significant hydronephrosis.  No renal lesions are seen.    Cortical echogenicity appears to be normal.  Left kidney demonstrates no significant   hydronephrosis.  There is cortical thinning especially in the interpolar region compatible with   scarring.  No mass evident.  The exam is under penetrated.  Visualized bladder appears within   normal limits.     IMPRESSION:                 1. Redemonstration lobulated cortical thinning involving left kidney as seen on previous CT   6/24/2020 suggestive of scarring.  No acute findings are seen.  The exam is under penetrated in   this finding is better evaluated on previous CT.               FRANCISCA BELTRAN MD         Electronically Signed and Approved By: FRANCISCA BELTRAN MD on 4/19/2023 at 15:25         Previous 7/2/2020:  39-year-old lady presents for further evaluation of left atrophic kidney.  She previously sought evaluation by PCP for shortness of air and tachycardia for which  chest CT was obtained.  Incidentally noted on CT chest was left renal atrophy with renal scarring.  Patient states she was unaware of size discrepancy of her kidneys.    Denies left-sided flank pain.  Denies history of pyelonephritis or urinary tract infection.  Denies having UTIs as a child.  Patient does report history of chronic back pain, but no flank pain.  Denies history of nephrolithiasis or hematuria.    Update 2020: Patient presents for follow-up after CT abdomen pelvis and MAG3 Lasix renal scan.  Patient denies significant changes since last visit.  Denies any flank pain or bothersome urinary symptoms.    creatinine  2020: 0.96  2020: 1.11    CT chest with contrast, 2020: Below the hemidiaphragms, there is chronic scarring involving left kidney.  The left kidney is also atrophic    CT abdomen pelvis, 2020: Moderate left renal cortical scarring.  Kidneys have symmetric enhancement and excretion no hydronephrosis.    MAG3 scan, 2020: Differential function left 40%: Right 60%; no evidence of obstructive uropathy     Left renal atrophy with moderate renal scarringCT imaging of MAG3 and reviewed and discussed with patient.  Despite left renal atrophy and scarring, it is a functioning kidney at 40%.  Has symmetric uptake of contrast on CT scan and is without evidence of obstructive uropathy.  No known etiology for the moderate renal scarring.  No acute intervention is warranted given these findings.     Obtain surveillance imaging via renal ultrasound in 1 year to assess for further atrophy    Follow-up 1 year with renal ultrasound prior    Objective     Past Medical History:   Diagnosis Date   • Deep vein thrombosis    • Hypertension        Past Surgical History:   Procedure Laterality Date   •  SECTION           Current Outpatient Medications:   •  lisinopril (PRINIVIL,ZESTRIL) 10 MG tablet, Take 1 tablet by mouth Daily., Disp: , Rfl:   •  metoprolol succinate XL (TOPROL-XL)  "50 MG 24 hr tablet, Take 1 tablet by mouth 2 (Two) Times a Day., Disp: 90 tablet, Rfl: 6  •  pantoprazole (PROTONIX) 40 MG EC tablet, Take 1 tablet by mouth Daily., Disp: , Rfl:     No Known Allergies     Family History   Problem Relation Age of Onset   • Thyroid disease Mother    • Thyroid disease Father    • Diabetes Father    • Anemia Father    • Heart disease Paternal Grandmother    • Cancer Paternal Grandmother    • Heart disease Paternal Grandfather    • Cancer Paternal Grandfather        Social History     Socioeconomic History   • Marital status:    Tobacco Use   • Smoking status: Former     Packs/day: 0.50     Years: 1.00     Pack years: 0.50     Types: Cigarettes     Start date: 2001     Quit date: 2001     Years since quittin.4     Passive exposure: Past   • Smokeless tobacco: Never   Vaping Use   • Vaping Use: Never used   Substance and Sexual Activity   • Alcohol use: Yes     Alcohol/week: 1.0 standard drink     Types: 1 Glasses of wine per week     Comment: occassionally   • Drug use: Never   • Sexual activity: Yes     Partners: Male     Birth control/protection: Tubal ligation       Vital Signs:   Resp 18   Ht 165.1 cm (65\")   Wt 97.2 kg (214 lb 3.2 oz)   BMI 35.64 kg/m²      Physical Exam  Vitals reviewed.   Constitutional:       Appearance: Normal appearance.   Neurological:      General: No focal deficit present.      Mental Status: She is alert and oriented to person, place, and time.   Psychiatric:         Mood and Affect: Mood normal.         Behavior: Behavior normal.          Result Review :   The following data was reviewed by: ABUNDIO Bonilla on 2023:           Procedures        Assessment and Plan    Diagnoses and all orders for this visit:    1. Atrophy, kidney (Primary)  -     Basic Metabolic Panel; Future  -     US Renal Bilateral; Future    2. Renal scarring  -     Basic Metabolic Panel; Future  -     US Renal Bilateral; Future    3. Urinary " frequency    Given that I have no kidney function studies to review, I have ordered a BMP to ensure that her kidneys are still functioning well.  We will plan to have her have a renal ultrasound in 1 year to ensure no further changes in the atrophic kidney.  She was advised that if she starts to develop sudden onset of urinary tract infections or other urinary symptoms that she should definitely call sooner for an appointment.    She is not currently interested in treatment for overactive bladder since she feels that this is directly related to her increasing her fluid intake recently.  She has stopped drinking teas and has been increasing her water intake for her health.  We did discuss that if this changes and the overactive bladder symptoms become more problematic and she would like to be treated she can let us know.      Follow Up   Return in about 1 year (around 5/12/2024) for with renal ultrasound.  Patient was given instructions and counseling regarding her condition or for health maintenance advice. Please see specific information pulled into the AVS if appropriate.         This document has been electronically signed by ABUNDIO Bonilla  May 12, 2023 14:51 EDT

## 2023-05-12 ENCOUNTER — OFFICE VISIT (OUTPATIENT)
Dept: UROLOGY | Facility: CLINIC | Age: 43
End: 2023-05-12
Payer: COMMERCIAL

## 2023-05-12 ENCOUNTER — LAB (OUTPATIENT)
Dept: LAB | Facility: HOSPITAL | Age: 43
End: 2023-05-12
Payer: COMMERCIAL

## 2023-05-12 VITALS — RESPIRATION RATE: 18 BRPM | WEIGHT: 214.2 LBS | HEIGHT: 65 IN | BODY MASS INDEX: 35.69 KG/M2

## 2023-05-12 DIAGNOSIS — R35.0 URINARY FREQUENCY: ICD-10-CM

## 2023-05-12 DIAGNOSIS — N28.89 RENAL SCARRING: ICD-10-CM

## 2023-05-12 DIAGNOSIS — N26.1 ATROPHY, KIDNEY: Primary | ICD-10-CM

## 2023-05-12 DIAGNOSIS — N26.1 ATROPHY, KIDNEY: ICD-10-CM

## 2023-05-12 LAB
ANION GAP SERPL CALCULATED.3IONS-SCNC: 7 MMOL/L (ref 5–15)
BUN SERPL-MCNC: 20 MG/DL (ref 6–20)
BUN/CREAT SERPL: 17.7 (ref 7–25)
CALCIUM SPEC-SCNC: 8.7 MG/DL (ref 8.6–10.5)
CHLORIDE SERPL-SCNC: 105 MMOL/L (ref 98–107)
CO2 SERPL-SCNC: 27 MMOL/L (ref 22–29)
CREAT SERPL-MCNC: 1.13 MG/DL (ref 0.57–1)
EGFRCR SERPLBLD CKD-EPI 2021: 62.8 ML/MIN/1.73
GLUCOSE SERPL-MCNC: 82 MG/DL (ref 65–99)
POTASSIUM SERPL-SCNC: 4.1 MMOL/L (ref 3.5–5.2)
SODIUM SERPL-SCNC: 139 MMOL/L (ref 136–145)

## 2023-05-12 PROCEDURE — 80048 BASIC METABOLIC PNL TOTAL CA: CPT

## 2023-05-12 PROCEDURE — 36415 COLL VENOUS BLD VENIPUNCTURE: CPT

## 2023-05-15 DIAGNOSIS — N26.1 ATROPHY, KIDNEY: Primary | ICD-10-CM

## 2023-05-26 ENCOUNTER — LAB (OUTPATIENT)
Dept: LAB | Facility: HOSPITAL | Age: 43
End: 2023-05-26
Payer: COMMERCIAL

## 2023-05-26 DIAGNOSIS — N26.1 ATROPHY, KIDNEY: ICD-10-CM

## 2023-05-26 LAB
ANION GAP SERPL CALCULATED.3IONS-SCNC: 10.5 MMOL/L (ref 5–15)
BUN SERPL-MCNC: 14 MG/DL (ref 6–20)
BUN/CREAT SERPL: 14.7 (ref 7–25)
CALCIUM SPEC-SCNC: 9.5 MG/DL (ref 8.6–10.5)
CHLORIDE SERPL-SCNC: 106 MMOL/L (ref 98–107)
CO2 SERPL-SCNC: 24.5 MMOL/L (ref 22–29)
CREAT SERPL-MCNC: 0.95 MG/DL (ref 0.57–1)
EGFRCR SERPLBLD CKD-EPI 2021: 77.4 ML/MIN/1.73
GLUCOSE SERPL-MCNC: 86 MG/DL (ref 65–99)
POTASSIUM SERPL-SCNC: 4.3 MMOL/L (ref 3.5–5.2)
SODIUM SERPL-SCNC: 141 MMOL/L (ref 136–145)

## 2023-05-26 PROCEDURE — 36415 COLL VENOUS BLD VENIPUNCTURE: CPT

## 2023-05-26 PROCEDURE — 80048 BASIC METABOLIC PNL TOTAL CA: CPT

## 2023-05-30 ENCOUNTER — TELEPHONE (OUTPATIENT)
Dept: UROLOGY | Facility: CLINIC | Age: 43
End: 2023-05-30

## 2023-09-24 NOTE — PROGRESS NOTES
[facility]  Cardiology progress Note    Patient Name: Karishma Meade  : 1980    CHIEF COMPLAINT  Palpitations        Subjective   Subjective     HISTORY OF PRESENT ILLNESS    Karishma Meade is a 43 y.o. female with history of palpitations and hypertension.  She has some palpitations off and on.  Occasional dizziness.    REVIEW OF SYSTEMS    Constitutional:    No fever, no weight loss  Skin:     No rash  Otolaryngeal:    No difficulty swallowing  Cardiovascular: See HPI.  Pulmonary:    No cough, no sputum production    Personal History     Social History:    reports that she quit smoking about 21 years ago. Her smoking use included cigarettes. She started smoking about 22 years ago. She has a 0.50 pack-year smoking history. She has been exposed to tobacco smoke. She has never used smokeless tobacco. She reports current alcohol use of about 1.0 standard drink per week. She reports that she does not use drugs.    Home Medications:  Current Outpatient Medications on File Prior to Visit   Medication Sig    metoprolol succinate XL (TOPROL-XL) 50 MG 24 hr tablet Take 1 tablet by mouth 2 (Two) Times a Day.    pantoprazole (PROTONIX) 40 MG EC tablet Take 1 tablet by mouth Daily.    [DISCONTINUED] lisinopril (PRINIVIL,ZESTRIL) 10 MG tablet Take 1 tablet by mouth Daily.     No current facility-administered medications on file prior to visit.       Past Medical History:   Diagnosis Date    Deep vein thrombosis     Hypertension        Allergies:  No Known Allergies    Objective    Objective       Vitals:   Heart Rate:  [77] 77  BP: (107)/(73) 107/73  Body mass index is 39.44 kg/m².     PHYSICAL EXAM:    General Appearance:   well developed  well nourished  HENT:   oropharynx moist  lips not cyanotic  Neck:  thyroid not enlarged  supple  Respiratory:  no respiratory distress  normal breath sounds  no rales  Cardiovascular:  no jugular venous distention  regular rhythm  apical impulse normal  S1 normal, S2  normal  no S3, no S4   no murmur  no rub, no thrill  carotid pulses normal; no bruit  pedal pulses normal  lower extremity edema: none    Skin:   warm, dry  Psychiatric:  judgement and insight appropriate  normal mood and affect        Result Review:  I have personally reviewed the available results from  [x]  Laboratory  [x]  EKG  [x]  Cardiology  [x]  Medications  [x]  Old records  []  Other:     Procedures       Impression/Plan:  1.  Essential hypertension controlled: Decrease lisinopril to 5 mg once a day.  Monitor blood pressure regularly.  2.  Palpitations/tachycardia: Continue Toprol-XL 50 mg twice a day.  Repeat 48-hour Holter monitor in view of her palpitations.           Christian Ma MD   09/26/23   14:18 EDT

## 2023-09-26 ENCOUNTER — OFFICE VISIT (OUTPATIENT)
Dept: CARDIOLOGY | Facility: CLINIC | Age: 43
End: 2023-09-26
Payer: COMMERCIAL

## 2023-09-26 VITALS
BODY MASS INDEX: 39.49 KG/M2 | SYSTOLIC BLOOD PRESSURE: 107 MMHG | HEART RATE: 77 BPM | WEIGHT: 237 LBS | HEIGHT: 65 IN | DIASTOLIC BLOOD PRESSURE: 73 MMHG

## 2023-09-26 DIAGNOSIS — I10 HYPERTENSION, ESSENTIAL: Primary | ICD-10-CM

## 2023-09-26 DIAGNOSIS — R00.2 PALPITATIONS: ICD-10-CM

## 2023-09-26 PROCEDURE — 99214 OFFICE O/P EST MOD 30 MIN: CPT | Performed by: SPECIALIST

## 2023-09-26 RX ORDER — LISINOPRIL 5 MG/1
5 TABLET ORAL DAILY
Qty: 90 TABLET | Refills: 5 | Status: SHIPPED | OUTPATIENT
Start: 2023-09-26

## 2023-11-03 ENCOUNTER — TELEPHONE (OUTPATIENT)
Dept: CARDIOLOGY | Facility: CLINIC | Age: 43
End: 2023-11-03
Payer: COMMERCIAL

## 2023-11-03 NOTE — TELEPHONE ENCOUNTER
----- Message from ABUNDIO Zhao sent at 11/3/2023  2:43 PM EDT -----  Notify pt holter result:  ·  Lowest heart rate was 56 bpm, average heart rate was 90 bpm, maximum heart rate was 143 bpm.  ·  Rare PAC and PVC, can be felt as palpitations, limit caffeine intake.  ·  4 patient activated events.  3 of these corresponded to sinus tachycardia.  1 of these corresponded to PVCs.  ·  No significant abnormal heart rhythms noted.  Continue current metoprolol dose, may take extra dose as needed for palpitations. Continue with echo on 01/30/24 and follow up as scheduled

## 2023-11-27 RX ORDER — METOPROLOL SUCCINATE 50 MG/1
50 TABLET, EXTENDED RELEASE ORAL 2 TIMES DAILY
Qty: 180 TABLET | Refills: 3 | Status: SHIPPED | OUTPATIENT
Start: 2023-11-27

## 2024-02-13 ENCOUNTER — HOSPITAL ENCOUNTER (OUTPATIENT)
Dept: CARDIOLOGY | Facility: HOSPITAL | Age: 44
Discharge: HOME | End: 2024-02-13
Admitting: SPECIALIST
Payer: COMMERCIAL

## 2024-02-13 DIAGNOSIS — R00.2 PALPITATIONS: ICD-10-CM

## 2024-02-13 LAB
BH CV ECHO MEAS - AO MAX PG: 9 MMHG
BH CV ECHO MEAS - AO MEAN PG: 5 MMHG
BH CV ECHO MEAS - AO ROOT DIAM: 3.3 CM
BH CV ECHO MEAS - AO V2 MAX: 152 CM/SEC
BH CV ECHO MEAS - AO V2 VTI: 33.3 CM
BH CV ECHO MEAS - AVA(I,D): 2.1 CM2
BH CV ECHO MEAS - EDV(CUBED): 85.2 ML
BH CV ECHO MEAS - EDV(MOD-SP2): 86 ML
BH CV ECHO MEAS - EDV(MOD-SP4): 73.6 ML
BH CV ECHO MEAS - EF(MOD-BP): 59.3 %
BH CV ECHO MEAS - EF(MOD-SP2): 66 %
BH CV ECHO MEAS - EF(MOD-SP4): 56.9 %
BH CV ECHO MEAS - ESV(CUBED): 24.4 ML
BH CV ECHO MEAS - ESV(MOD-SP2): 29.2 ML
BH CV ECHO MEAS - ESV(MOD-SP4): 31.7 ML
BH CV ECHO MEAS - FS: 34.1 %
BH CV ECHO MEAS - IVS/LVPW: 1 CM
BH CV ECHO MEAS - IVSD: 1 CM
BH CV ECHO MEAS - LA DIMENSION: 3.8 CM
BH CV ECHO MEAS - LAT PEAK E' VEL: 18.6 CM/SEC
BH CV ECHO MEAS - LV DIASTOLIC VOL/BSA (35-75): 34.4 CM2
BH CV ECHO MEAS - LV MASS(C)D: 147.8 GRAMS
BH CV ECHO MEAS - LV MAX PG: 4.2 MMHG
BH CV ECHO MEAS - LV MEAN PG: 2 MMHG
BH CV ECHO MEAS - LV SYSTOLIC VOL/BSA (12-30): 14.8 CM2
BH CV ECHO MEAS - LV V1 MAX: 102 CM/SEC
BH CV ECHO MEAS - LV V1 VTI: 22.3 CM
BH CV ECHO MEAS - LVIDD: 4.4 CM
BH CV ECHO MEAS - LVIDS: 2.9 CM
BH CV ECHO MEAS - LVOT AREA: 3.1 CM2
BH CV ECHO MEAS - LVOT DIAM: 2 CM
BH CV ECHO MEAS - LVPWD: 1 CM
BH CV ECHO MEAS - MED PEAK E' VEL: 11.6 CM/SEC
BH CV ECHO MEAS - MV A MAX VEL: 95.4 CM/SEC
BH CV ECHO MEAS - MV DEC TIME: 0.22 SEC
BH CV ECHO MEAS - MV E MAX VEL: 99 CM/SEC
BH CV ECHO MEAS - MV E/A: 1.04
BH CV ECHO MEAS - RAP SYSTOLE: 3 MMHG
BH CV ECHO MEAS - RVSP: 28 MMHG
BH CV ECHO MEAS - SI(MOD-SP2): 26.6 ML/M2
BH CV ECHO MEAS - SI(MOD-SP4): 19.6 ML/M2
BH CV ECHO MEAS - SV(LVOT): 70.1 ML
BH CV ECHO MEAS - SV(MOD-SP2): 56.8 ML
BH CV ECHO MEAS - SV(MOD-SP4): 41.9 ML
BH CV ECHO MEAS - TAPSE (>1.6): 2.38 CM
BH CV ECHO MEAS - TR MAX PG: 25 MMHG
BH CV ECHO MEAS - TR MAX VEL: 250 CM/SEC
BH CV ECHO MEASUREMENTS AVERAGE E/E' RATIO: 6.56
LEFT ATRIUM VOLUME INDEX: 19.7 ML/M2

## 2024-02-13 PROCEDURE — 93306 TTE W/DOPPLER COMPLETE: CPT

## 2024-02-13 PROCEDURE — 93306 TTE W/DOPPLER COMPLETE: CPT | Performed by: SPECIALIST

## 2024-05-07 ENCOUNTER — OFFICE VISIT (OUTPATIENT)
Dept: CARDIOLOGY | Facility: CLINIC | Age: 44
End: 2024-05-07
Payer: COMMERCIAL

## 2024-05-07 VITALS
DIASTOLIC BLOOD PRESSURE: 71 MMHG | WEIGHT: 255.6 LBS | HEART RATE: 68 BPM | SYSTOLIC BLOOD PRESSURE: 109 MMHG | HEIGHT: 65 IN | BODY MASS INDEX: 42.59 KG/M2

## 2024-05-07 DIAGNOSIS — I10 HYPERTENSION, ESSENTIAL: ICD-10-CM

## 2024-05-07 DIAGNOSIS — R00.2 PALPITATIONS: Primary | ICD-10-CM

## 2024-05-07 PROCEDURE — 99214 OFFICE O/P EST MOD 30 MIN: CPT | Performed by: SPECIALIST

## 2024-05-07 RX ORDER — DULOXETIN HYDROCHLORIDE 30 MG/1
1 CAPSULE, DELAYED RELEASE ORAL DAILY
COMMUNITY
Start: 2024-03-16

## 2024-05-09 ENCOUNTER — HOSPITAL ENCOUNTER (OUTPATIENT)
Dept: ULTRASOUND IMAGING | Facility: HOSPITAL | Age: 44
Discharge: HOME | End: 2024-05-09
Admitting: NURSE PRACTITIONER
Payer: COMMERCIAL

## 2024-05-09 DIAGNOSIS — N26.1 ATROPHY, KIDNEY: ICD-10-CM

## 2024-05-09 DIAGNOSIS — N28.89 RENAL SCARRING: ICD-10-CM

## 2024-05-09 PROCEDURE — 76775 US EXAM ABDO BACK WALL LIM: CPT

## 2024-06-03 NOTE — PROGRESS NOTES
Chief Complaint: Kidney Atrophy    Subjective         History of Present Illness  Karishma Meade is a 43 y.o. female presents to [facility] to be seen for annual.      Patient was previously seen by me with last visit on 5/12/2023 for atrophic left kidney.  At that visit she was advised to follow-up in 1 year for follow-up ultrasound.  She is here for follow-up.    Denies any urinary tract infections since her last visit a year ago.    She reports she does have urinary frequency, urgency and occasional stress incontinence. She has never been treated for OAB.     US RENAL BILATERAL-     Date of Exam: 5/9/2024 4:01 PM     Indication: renal scarring, renal atrophy; N26.1-Atrophy of kidney  (terminal); N28.89-Other specified disorders of kidney and ureter.     Comparison: 4/19/2023     Technique: Grayscale and color Doppler ultrasound evaluation of the  kidneys and urinary bladder was performed.        Findings:  The left kidney measures 9.2 cm in length, with unchanged atrophic  appearance including increased echogenicity. The right kidney measures  12.5 cm in length. No hydronephrosis or nephrolithiasis. No suspicious  renal lesion.     Visualized portions of the urinary bladder are within normal limits.     IMPRESSION:  Impression:  Unchanged atrophic appearance of the left kidney. No hydronephrosis.           Electronically Signed By-Jorge Chaney MD On:5/9/2024 5:10 PM    Previous 5/12/2023:  Patient was previously seen by Dr. Dorothea Flores with last visit being 7/2/2020 for atrophic left kidney.     Patient reports that her original finding of atrophic kidney was found incidentally during a chest CT.      Denies recurrent UTI.      Frequency- admits- but reports she has recently increased her water intake     Urgency- admits     Incontinence- with urgency, some with cough/laugh/sneeze     Nocturia- 3-4     GH- denies      surgeries- denies     Family history of  malignancy-  denies     Cardiopulmonary- HTN, irregular heartbeat     Anticoagulants- denies     Smoker- denies        Renal ultrasound:  INDICATIONS:  RENAL ATROPHY     FINDINGS:          The right kidney measures about 11.7 centimeters in length.  Left kidney measures 9.7 centimeters   in length.  Right kidney demonstrates no significant hydronephrosis.  No renal lesions are seen.    Cortical echogenicity appears to be normal.  Left kidney demonstrates no significant   hydronephrosis.  There is cortical thinning especially in the interpolar region compatible with   scarring.  No mass evident.  The exam is under penetrated.  Visualized bladder appears within   normal limits.     IMPRESSION:                 1. Redemonstration lobulated cortical thinning involving left kidney as seen on previous CT   6/24/2020 suggestive of scarring.  No acute findings are seen.  The exam is under penetrated in   this finding is better evaluated on previous CT.               FRANCISCA BELTRAN MD         Electronically Signed and Approved By: FRANCISCA BELTRAN MD on 4/19/2023 at 15:25           Previous 7/2/2020:  39-year-old lady presents for further evaluation of left atrophic kidney.  She previously sought evaluation by PCP for shortness of air and tachycardia for which chest CT was obtained.  Incidentally noted on CT chest was left renal atrophy with renal scarring.  Patient states she was unaware of size discrepancy of her kidneys.    Denies left-sided flank pain.  Denies history of pyelonephritis or urinary tract infection.  Denies having UTIs as a child.  Patient does report history of chronic back pain, but no flank pain.  Denies history of nephrolithiasis or hematuria.    Update 7/2/2020: Patient presents for follow-up after CT abdomen pelvis and MAG3 Lasix renal scan.  Patient denies significant changes since last visit.  Denies any flank pain or bothersome urinary symptoms.    creatinine  5/20/2020: 0.96  5/7/2020: 1.11    CT chest with  contrast, 2020: Below the hemidiaphragms, there is chronic scarring involving left kidney.  The left kidney is also atrophic    CT abdomen pelvis, 2020: Moderate left renal cortical scarring.  Kidneys have symmetric enhancement and excretion no hydronephrosis.    MAG3 scan, 2020: Differential function left 40%: Right 60%; no evidence of obstructive uropathy      Left renal atrophy with moderate renal scarringCT imaging of MAG3 and reviewed and discussed with patient.  Despite left renal atrophy and scarring, it is a functioning kidney at 40%.  Has symmetric uptake of contrast on CT scan and is without evidence of obstructive uropathy.  No known etiology for the moderate renal scarring.  No acute intervention is warranted given these findings.     Obtain surveillance imaging via renal ultrasound in 1 year to assess for further atrophy    Follow-up 1 year with renal ultrasound prior  Objective     Past Medical History:   Diagnosis Date    Deep vein thrombosis     Hypertension        Past Surgical History:   Procedure Laterality Date     SECTION           Current Outpatient Medications:     DULoxetine (CYMBALTA) 30 MG capsule, Take 1 capsule by mouth Daily., Disp: , Rfl:     lisinopril (PRINIVIL,ZESTRIL) 5 MG tablet, Take 1 tablet by mouth Daily., Disp: 90 tablet, Rfl: 5    metoprolol succinate XL (TOPROL-XL) 50 MG 24 hr tablet, TAKE 1 TABLET BY MOUTH TWICE DAILY, Disp: 180 tablet, Rfl: 3    pantoprazole (PROTONIX) 40 MG EC tablet, Take 1 tablet by mouth Daily., Disp: , Rfl:     oxybutynin XL (DITROPAN-XL) 5 MG 24 hr tablet, Take 1 tablet by mouth Daily., Disp: 30 tablet, Rfl: 2    No Known Allergies     Family History   Problem Relation Age of Onset    Thyroid disease Mother     Thyroid disease Father     Diabetes Father     Anemia Father     Heart disease Paternal Grandmother     Cancer Paternal Grandmother     Heart disease Paternal Grandfather     Cancer Paternal Grandfather        Social  "History     Socioeconomic History    Marital status:    Tobacco Use    Smoking status: Former     Current packs/day: 0.00     Average packs/day: 0.5 packs/day for 1 year (0.5 ttl pk-yrs)     Types: Cigarettes     Start date: 2001     Quit date: 2001     Years since quittin.5     Passive exposure: Past    Smokeless tobacco: Never   Vaping Use    Vaping status: Never Used   Substance and Sexual Activity    Alcohol use: Yes     Alcohol/week: 1.0 standard drink of alcohol     Types: 1 Glasses of wine per week     Comment: occassionally    Drug use: Never    Sexual activity: Yes     Partners: Male     Birth control/protection: Tubal ligation       Vital Signs:   /69 (BP Location: Left arm, Patient Position: Sitting, Cuff Size: Large Adult)   Pulse 73   Ht 165.1 cm (65\")   Wt 116 kg (255 lb 11.7 oz)   BMI 42.56 kg/m²      Physical Exam  Vitals reviewed.   Constitutional:       Appearance: Normal appearance.   Neurological:      General: No focal deficit present.      Mental Status: She is alert and oriented to person, place, and time.   Psychiatric:         Mood and Affect: Mood normal.         Behavior: Behavior normal.          Result Review :   The following data was reviewed by: ABUNDIO Bonilla on 2024:     Bladder Scan interpretation 2024    Estimation of residual urine via [employer] [place] Bladder Scan  MA/nurse performing: La Nena RILEY MA  Residual Urine: 0 ml  Indication: Overactive bladder    Atrophy, kidney   Position: Supine  Examination: Incremental scanning of the suprapubic area using 2.0 MHz transducer using copious amounts of acoustic gel.   Findings: An anechoic area was demonstrated which represented the bladder, with measurement of residual urine as noted. I inspected this myself. In that the residual urine was stable or insignificant, refer to plan for treatment and plan necessary at this time.             Procedures        Assessment and Plan  "   Diagnoses and all orders for this visit:    1. Overactive bladder (Primary)  -     oxybutynin XL (DITROPAN-XL) 5 MG 24 hr tablet; Take 1 tablet by mouth Daily.  Dispense: 30 tablet; Refill: 2  -     Bladder Scan    2. Atrophy, kidney    Overactive bladder-discussed with patient at length, all questions addressed. Discussed with patient that urinary urgency and frequency due to overactive bladder is a common condition that is multifactorial in nature, frequently difficult to treat, unlikely to cure, and management is dictated by patient motivation to improve and cope with symptoms.     Initiate first-line conservative therapy; behavioral modifications including bladder training via timed and double voiding; fluid management, limiting fluids prior to sleep, and voiding immediately prior to sleep.   -Patient also interested in trial of medication for OAB.  Will initiate second line therapy via trial of anticholinergic, oxybutynin ER, prescribed.  Patient to take for 4-6 weeks to ensure adequate trial.  Side effects of this class discussed with patient including but not limited to dry mouth and constipation.     Given that her renal ultrasound remained stable and no changes to the atrophic kidney, no further workup needed for this.  We did discuss that if she does begin to have repeated urinary tract infections that may be something to be revisited.    I will plan to see her back in 6 weeks or sooner for any new concerns.       Follow Up   Return in about 6 weeks (around 7/17/2024).  Patient was given instructions and counseling regarding her condition or for health maintenance advice. Please see specific information pulled into the AVS if appropriate.         This document has been electronically signed by ABUNDIO Bonilla  June 5, 2024 13:42 EDT

## 2024-06-05 ENCOUNTER — OFFICE VISIT (OUTPATIENT)
Dept: UROLOGY | Facility: CLINIC | Age: 44
End: 2024-06-05
Payer: COMMERCIAL

## 2024-06-05 VITALS
SYSTOLIC BLOOD PRESSURE: 124 MMHG | WEIGHT: 255.73 LBS | HEART RATE: 73 BPM | DIASTOLIC BLOOD PRESSURE: 69 MMHG | BODY MASS INDEX: 42.61 KG/M2 | HEIGHT: 65 IN

## 2024-06-05 DIAGNOSIS — N32.81 OVERACTIVE BLADDER: Primary | ICD-10-CM

## 2024-06-05 DIAGNOSIS — N26.1 ATROPHY, KIDNEY: ICD-10-CM

## 2024-06-05 LAB — URINE VOLUME: 0

## 2024-06-05 RX ORDER — OXYBUTYNIN CHLORIDE 5 MG/1
5 TABLET, EXTENDED RELEASE ORAL DAILY
Qty: 30 TABLET | Refills: 2 | Status: SHIPPED | OUTPATIENT
Start: 2024-06-05

## 2024-07-16 NOTE — PROGRESS NOTES
Chief Complaint: Overactive bladder    Subjective         History of Present Illness  Karishma Meade is a 44 y.o. female presents to [facility] to be seen for follow-up.    Patient was previously seen by me with last visit on 6/5/2024 for overactive bladder and need atrophic kidney.  She was started on oxybutynin at that visit.  She is here for follow-up.  We also discussed that she does not need any further workup on her atrophic kidney.    She did not see any difference with oxybutynin.       Previous 6/5/2024:  Patient was previously seen by me with last visit on 5/12/2023 for atrophic left kidney.  At that visit she was advised to follow-up in 1 year for follow-up ultrasound.  She is here for follow-up.     Denies any urinary tract infections since her last visit a year ago.     She reports she does have urinary frequency, urgency and occasional stress incontinence. She has never been treated for OAB.      US RENAL BILATERAL-     Date of Exam: 5/9/2024 4:01 PM     Indication: renal scarring, renal atrophy; N26.1-Atrophy of kidney  (terminal); N28.89-Other specified disorders of kidney and ureter.     Comparison: 4/19/2023     Technique: Grayscale and color Doppler ultrasound evaluation of the  kidneys and urinary bladder was performed.        Findings:  The left kidney measures 9.2 cm in length, with unchanged atrophic  appearance including increased echogenicity. The right kidney measures  12.5 cm in length. No hydronephrosis or nephrolithiasis. No suspicious  renal lesion.     Visualized portions of the urinary bladder are within normal limits.     IMPRESSION:  Impression:  Unchanged atrophic appearance of the left kidney. No hydronephrosis.           Electronically Signed By-Jorge Chaney MD On:5/9/2024 5:10 PM     Previous 5/12/2023:  Patient was previously seen by Dr. Dorothea Flores with last visit being 7/2/2020 for atrophic left kidney.     Patient reports that her original  finding of atrophic kidney was found incidentally during a chest CT.      Denies recurrent UTI.      Frequency- admits- but reports she has recently increased her water intake     Urgency- admits     Incontinence- with urgency, some with cough/laugh/sneeze     Nocturia- 3-4     GH- denies      surgeries- denies     Family history of  malignancy- denies     Cardiopulmonary- HTN, irregular heartbeat     Anticoagulants- denies     Smoker- denies        Renal ultrasound:  INDICATIONS:  RENAL ATROPHY     FINDINGS:          The right kidney measures about 11.7 centimeters in length.  Left kidney measures 9.7 centimeters   in length.  Right kidney demonstrates no significant hydronephrosis.  No renal lesions are seen.    Cortical echogenicity appears to be normal.  Left kidney demonstrates no significant   hydronephrosis.  There is cortical thinning especially in the interpolar region compatible with   scarring.  No mass evident.  The exam is under penetrated.  Visualized bladder appears within   normal limits.     IMPRESSION:                 1. Redemonstration lobulated cortical thinning involving left kidney as seen on previous CT   6/24/2020 suggestive of scarring.  No acute findings are seen.  The exam is under penetrated in   this finding is better evaluated on previous CT.               FRANCISCA BELTRAN MD         Electronically Signed and Approved By: FRANCISCA BELTRAN MD on 4/19/2023 at 15:25           Previous 7/2/2020:  39-year-old lady presents for further evaluation of left atrophic kidney.  She previously sought evaluation by PCP for shortness of air and tachycardia for which chest CT was obtained.  Incidentally noted on CT chest was left renal atrophy with renal scarring.  Patient states she was unaware of size discrepancy of her kidneys.    Denies left-sided flank pain.  Denies history of pyelonephritis or urinary tract infection.  Denies having UTIs as a child.  Patient does report history of chronic  back pain, but no flank pain.  Denies history of nephrolithiasis or hematuria.    Update 2020: Patient presents for follow-up after CT abdomen pelvis and MAG3 Lasix renal scan.  Patient denies significant changes since last visit.  Denies any flank pain or bothersome urinary symptoms.    creatinine  2020: 0.96  2020: 1.11    CT chest with contrast, 2020: Below the hemidiaphragms, there is chronic scarring involving left kidney.  The left kidney is also atrophic    CT abdomen pelvis, 2020: Moderate left renal cortical scarring.  Kidneys have symmetric enhancement and excretion no hydronephrosis.    MAG3 scan, 2020: Differential function left 40%: Right 60%; no evidence of obstructive uropathy      Left renal atrophy with moderate renal scarringCT imaging of MAG3 and reviewed and discussed with patient.  Despite left renal atrophy and scarring, it is a functioning kidney at 40%.  Has symmetric uptake of contrast on CT scan and is without evidence of obstructive uropathy.  No known etiology for the moderate renal scarring.  No acute intervention is warranted given these findings.     Obtain surveillance imaging via renal ultrasound in 1 year to assess for further atrophy    Follow-up 1 year with renal ultrasound prior       Objective     Past Medical History:   Diagnosis Date    Deep vein thrombosis     Hypertension        Past Surgical History:   Procedure Laterality Date     SECTION           Current Outpatient Medications:     DULoxetine (CYMBALTA) 30 MG capsule, Take 1 capsule by mouth Daily., Disp: , Rfl:     lisinopril (PRINIVIL,ZESTRIL) 5 MG tablet, Take 1 tablet by mouth Daily., Disp: 90 tablet, Rfl: 5    metoprolol succinate XL (TOPROL-XL) 50 MG 24 hr tablet, TAKE 1 TABLET BY MOUTH TWICE DAILY, Disp: 180 tablet, Rfl: 3    pantoprazole (PROTONIX) 40 MG EC tablet, Take 1 tablet by mouth Daily., Disp: , Rfl:     tolterodine LA (DETROL LA) 2 MG 24 hr capsule, Take 1 capsule by  "mouth Daily., Disp: 30 capsule, Rfl: 2    No Known Allergies     Family History   Problem Relation Age of Onset    Thyroid disease Mother     Thyroid disease Father     Diabetes Father     Anemia Father     Heart disease Paternal Grandmother     Cancer Paternal Grandmother     Heart disease Paternal Grandfather     Cancer Paternal Grandfather        Social History     Socioeconomic History    Marital status:    Tobacco Use    Smoking status: Former     Current packs/day: 0.00     Average packs/day: 0.5 packs/day for 1 year (0.5 ttl pk-yrs)     Types: Cigarettes     Start date: 2001     Quit date: 2001     Years since quittin.6     Passive exposure: Past    Smokeless tobacco: Never   Vaping Use    Vaping status: Never Used   Substance and Sexual Activity    Alcohol use: Yes     Alcohol/week: 1.0 standard drink of alcohol     Types: 1 Glasses of wine per week     Comment: occassionally    Drug use: Never    Sexual activity: Yes     Partners: Male     Birth control/protection: Tubal ligation       Vital Signs:   /81 (BP Location: Left arm, Patient Position: Sitting, Cuff Size: Large Adult)   Pulse 88   Ht 165.1 cm (65\")   Wt 116 kg (255 lb 11.7 oz)   BMI 42.56 kg/m²      Physical Exam  Vitals reviewed.   Constitutional:       Appearance: Normal appearance.   Neurological:      General: No focal deficit present.      Mental Status: She is alert and oriented to person, place, and time.   Psychiatric:         Mood and Affect: Mood normal.         Behavior: Behavior normal.          Result Review :   The following data was reviewed by: ABUNDIO Bonilla on 2024:  Results for orders placed or performed in visit on 24   Bladder Scan   Result Value Ref Range    Urine Volume 0         Bladder Scan interpretation 2024    Estimation of residual urine via BVI 3000 Verathon Bladder Scan  MA/nurse performing: La Nena RILEY MA  Residual Urine: 0 ml  Indication: Overactive " bladder   Position: Supine  Examination: Incremental scanning of the suprapubic area using 2.0 MHz transducer using copious amounts of acoustic gel.   Findings: An anechoic area was demonstrated which represented the bladder, with measurement of residual urine as noted. I inspected this myself. In that the residual urine was stable or insignificant, refer to plan for treatment and plan necessary at this time.           Procedures        Assessment and Plan    Diagnoses and all orders for this visit:    1. Overactive bladder (Primary)  -     Bladder Scan  -     tolterodine LA (DETROL LA) 2 MG 24 hr capsule; Take 1 capsule by mouth Daily.  Dispense: 30 capsule; Refill: 2    She has not seen improvement with oxybutynin, we will switch her to tolterodine.  I will see her back in 1 month or sooner for any concerns.    Follow Up   No follow-ups on file.  Patient was given instructions and counseling regarding her condition or for health maintenance advice. Please see specific information pulled into the AVS if appropriate.         This document has been electronically signed by ABUNDIO Bonilla  July 17, 2024 14:13 EDT

## 2024-07-17 ENCOUNTER — OFFICE VISIT (OUTPATIENT)
Dept: UROLOGY | Facility: CLINIC | Age: 44
End: 2024-07-17
Payer: COMMERCIAL

## 2024-07-17 VITALS
WEIGHT: 255.73 LBS | DIASTOLIC BLOOD PRESSURE: 81 MMHG | HEIGHT: 65 IN | HEART RATE: 88 BPM | BODY MASS INDEX: 42.61 KG/M2 | SYSTOLIC BLOOD PRESSURE: 130 MMHG

## 2024-07-17 DIAGNOSIS — N32.81 OVERACTIVE BLADDER: Primary | ICD-10-CM

## 2024-07-17 LAB — URINE VOLUME: 0

## 2024-07-17 RX ORDER — TOLTERODINE 2 MG/1
2 CAPSULE, EXTENDED RELEASE ORAL DAILY
Qty: 30 CAPSULE | Refills: 2 | Status: SHIPPED | OUTPATIENT
Start: 2024-07-17

## 2024-08-20 NOTE — PROGRESS NOTES
Chief Complaint: <9>Overactive bladder    Subjective         History of Present Illness  Karishma Meade is a 44 y.o. female presents to [facility] to be seen for follow-up.    Patient was previously seen by me with last visit on 7/17/2024 for overactive bladder.  I did start her on tolterodine at that visit.  She is here for follow-up.    She is seeing a little improvement in symptoms with tolterodine.  She does notice that it is worse when she is hydrating effectively.    Previous 7/17/2024:  Patient was previously seen by me with last visit on 6/5/2024 for overactive bladder and atrophic kidney.  She was started on oxybutynin at that visit.  She is here for follow-up.  We also discussed that she does not need any further workup on her atrophic kidney.     She did not see any difference with oxybutynin.         Previous 6/5/2024:  Patient was previously seen by me with last visit on 5/12/2023 for atrophic left kidney.  At that visit she was advised to follow-up in 1 year for follow-up ultrasound.  She is here for follow-up.     Denies any urinary tract infections since her last visit a year ago.     She reports she does have urinary frequency, urgency and occasional stress incontinence. She has never been treated for OAB.      US RENAL BILATERAL-     Date of Exam: 5/9/2024 4:01 PM     Indication: renal scarring, renal atrophy; N26.1-Atrophy of kidney  (terminal); N28.89-Other specified disorders of kidney and ureter.     Comparison: 4/19/2023   Findings:  The left kidney measures 9.2 cm in length, with unchanged atrophic  appearance including increased echogenicity. The right kidney measures  12.5 cm in length. No hydronephrosis or nephrolithiasis. No suspicious  renal lesion.     Visualized portions of the urinary bladder are within normal limits.     IMPRESSION:  Impression:  Unchanged atrophic appearance of the left kidney. No hydronephrosis.           Electronically Signed By-Jorge  MD Shiv On:5/9/2024 5:10 PM     Previous 5/12/2023:  Patient was previously seen by Dr. Dorothea Flores with last visit being 7/2/2020 for atrophic left kidney.     Patient reports that her original finding of atrophic kidney was found incidentally during a chest CT.      Denies recurrent UTI.      Frequency- admits- but reports she has recently increased her water intake     Urgency- admits     Incontinence- with urgency, some with cough/laugh/sneeze     Nocturia- 3-4     GH- denies      surgeries- denies     Family history of  malignancy- denies     Cardiopulmonary- HTN, irregular heartbeat     Anticoagulants- denies     Smoker- denies        Renal ultrasound:  INDICATIONS:  RENAL ATROPHY     FINDINGS:          The right kidney measures about 11.7 centimeters in length.  Left kidney measures 9.7 centimeters   in length.  Right kidney demonstrates no significant hydronephrosis.  No renal lesions are seen.    Cortical echogenicity appears to be normal.  Left kidney demonstrates no significant   hydronephrosis.  There is cortical thinning especially in the interpolar region compatible with   scarring.  No mass evident.  The exam is under penetrated.  Visualized bladder appears within   normal limits.     IMPRESSION:                 1. Redemonstration lobulated cortical thinning involving left kidney as seen on previous CT   6/24/2020 suggestive of scarring.  No acute findings are seen.  The exam is under penetrated in   this finding is better evaluated on previous CT.               FRANCISCA BELTRAN MD         Electronically Signed and Approved By: FRANCISCA BELTRAN MD on 4/19/2023 at 15:25           Previous 7/2/2020:  39-year-old lady presents for further evaluation of left atrophic kidney.  She previously sought evaluation by PCP for shortness of air and tachycardia for which chest CT was obtained.  Incidentally noted on CT chest was left renal atrophy with renal scarring.  Patient states she was unaware of  size discrepancy of her kidneys.    Denies left-sided flank pain.  Denies history of pyelonephritis or urinary tract infection.  Denies having UTIs as a child.  Patient does report history of chronic back pain, but no flank pain.  Denies history of nephrolithiasis or hematuria.    Update 2020: Patient presents for follow-up after CT abdomen pelvis and MAG3 Lasix renal scan.  Patient denies significant changes since last visit.  Denies any flank pain or bothersome urinary symptoms.    creatinine  2020: 0.96  2020: 1.11    CT chest with contrast, 2020: Below the hemidiaphragms, there is chronic scarring involving left kidney.  The left kidney is also atrophic    CT abdomen pelvis, 2020: Moderate left renal cortical scarring.  Kidneys have symmetric enhancement and excretion no hydronephrosis.    MAG3 scan, 2020: Differential function left 40%: Right 60%; no evidence of obstructive uropathy      Left renal atrophy with moderate renal scarringCT imaging of MAG3 and reviewed and discussed with patient.  Despite left renal atrophy and scarring, it is a functioning kidney at 40%.  Has symmetric uptake of contrast on CT scan and is without evidence of obstructive uropathy.  No known etiology for the moderate renal scarring.  No acute intervention is warranted given these findings.     Obtain surveillance imaging via renal ultrasound in 1 year to assess for further atrophy    Follow-up 1 year with renal ultrasound prior       Objective     Past Medical History:   Diagnosis Date    Deep vein thrombosis     Hypertension        Past Surgical History:   Procedure Laterality Date     SECTION           Current Outpatient Medications:     DULoxetine (CYMBALTA) 30 MG capsule, Take 1 capsule by mouth Daily., Disp: , Rfl:     lisinopril (PRINIVIL,ZESTRIL) 5 MG tablet, Take 1 tablet by mouth Daily., Disp: 90 tablet, Rfl: 5    metoprolol succinate XL (TOPROL-XL) 50 MG 24 hr tablet, TAKE 1 TABLET BY  "MOUTH TWICE DAILY, Disp: 180 tablet, Rfl: 3    pantoprazole (PROTONIX) 40 MG EC tablet, Take 1 tablet by mouth Daily., Disp: , Rfl:     tolterodine LA (DETROL LA) 4 MG 24 hr capsule, Take 1 capsule by mouth Daily., Disp: 30 capsule, Rfl: 3    No Known Allergies     Family History   Problem Relation Age of Onset    Thyroid disease Mother     Thyroid disease Father     Diabetes Father     Anemia Father     Heart disease Paternal Grandmother     Cancer Paternal Grandmother     Heart disease Paternal Grandfather     Cancer Paternal Grandfather        Social History     Socioeconomic History    Marital status:    Tobacco Use    Smoking status: Former     Current packs/day: 0.00     Average packs/day: 0.5 packs/day for 1 year (0.5 ttl pk-yrs)     Types: Cigarettes     Start date: 2001     Quit date: 2001     Years since quittin.7     Passive exposure: Past    Smokeless tobacco: Never   Vaping Use    Vaping status: Never Used   Substance and Sexual Activity    Alcohol use: Yes     Alcohol/week: 1.0 standard drink of alcohol     Types: 1 Glasses of wine per week     Comment: occassionally    Drug use: Never    Sexual activity: Yes     Partners: Male     Birth control/protection: Tubal ligation       Vital Signs:   /73 (BP Location: Right arm, Patient Position: Sitting, Cuff Size: Large Adult)   Pulse 77   Ht 165.1 cm (65\")   Wt 116 kg (255 lb 11.7 oz)   BMI 42.56 kg/m²      Physical Exam  Vitals reviewed.   Constitutional:       Appearance: Normal appearance.   Neurological:      General: No focal deficit present.      Mental Status: She is alert and oriented to person, place, and time.   Psychiatric:         Mood and Affect: Mood normal.         Behavior: Behavior normal.          Result Review :   The following data was reviewed by: ABUNDIO Bonilla on 2024:  Results for orders placed or performed in visit on 24   Bladder Scan   Result Value Ref Range    Urine Volume " 0         Bladder Scan interpretation 08/21/2024    Estimation of residual urine via BVI 3000 Verathon Bladder Scan  MA/nurse performing: La Nena RILEY MA  Residual Urine: 0 ml  Indication: Overactive bladder   Position: Supine  Examination: Incremental scanning of the suprapubic area using 2.0 MHz transducer using copious amounts of acoustic gel.   Findings: An anechoic area was demonstrated which represented the bladder, with measurement of residual urine as noted. I inspected this myself. In that the residual urine was stable or insignificant, refer to plan for treatment and plan necessary at this time.           Procedures        Assessment and Plan    Diagnoses and all orders for this visit:    1. Overactive bladder (Primary)  -     Bladder Scan  -     tolterodine LA (DETROL LA) 4 MG 24 hr capsule; Take 1 capsule by mouth Daily.  Dispense: 30 capsule; Refill: 3    Given that she did see improvement but not resolution, we will increase her dose to 4 mg daily.  We did discuss if this is not effective that we may need to consider switching to a different agent.    I will plan to see her back in 3 months or sooner for new concerns.    Follow Up   Return in about 3 months (around 11/21/2024).  Patient was given instructions and counseling regarding her condition or for health maintenance advice. Please see specific information pulled into the AVS if appropriate.         This document has been electronically signed by ABUNDIO Bonilla  August 21, 2024 14:19 EDT

## 2024-08-21 ENCOUNTER — OFFICE VISIT (OUTPATIENT)
Dept: UROLOGY | Facility: CLINIC | Age: 44
End: 2024-08-21
Payer: COMMERCIAL

## 2024-08-21 VITALS
BODY MASS INDEX: 42.61 KG/M2 | WEIGHT: 255.73 LBS | SYSTOLIC BLOOD PRESSURE: 117 MMHG | DIASTOLIC BLOOD PRESSURE: 73 MMHG | HEIGHT: 65 IN | HEART RATE: 77 BPM

## 2024-08-21 DIAGNOSIS — N32.81 OVERACTIVE BLADDER: Primary | ICD-10-CM

## 2024-08-21 LAB — URINE VOLUME: 0

## 2024-08-21 RX ORDER — TOLTERODINE 4 MG/1
4 CAPSULE, EXTENDED RELEASE ORAL DAILY
Qty: 30 CAPSULE | Refills: 3 | Status: SHIPPED | OUTPATIENT
Start: 2024-08-21

## 2024-10-22 VITALS
HEART RATE: 80 BPM | SYSTOLIC BLOOD PRESSURE: 156 MMHG | DIASTOLIC BLOOD PRESSURE: 89 MMHG | HEART RATE: 80 BPM | HEART RATE: 80 BPM | SYSTOLIC BLOOD PRESSURE: 112 MMHG | HEART RATE: 78 BPM | HEART RATE: 77 BPM | DIASTOLIC BLOOD PRESSURE: 80 MMHG | DIASTOLIC BLOOD PRESSURE: 79 MMHG | RESPIRATION RATE: 20 BRPM | SYSTOLIC BLOOD PRESSURE: 127 MMHG | SYSTOLIC BLOOD PRESSURE: 124 MMHG | RESPIRATION RATE: 33 BRPM | HEART RATE: 77 BPM | OXYGEN SATURATION: 98 % | SYSTOLIC BLOOD PRESSURE: 131 MMHG | SYSTOLIC BLOOD PRESSURE: 127 MMHG | HEART RATE: 77 BPM | SYSTOLIC BLOOD PRESSURE: 111 MMHG | TEMPERATURE: 97.5 F | RESPIRATION RATE: 33 BRPM | DIASTOLIC BLOOD PRESSURE: 81 MMHG | SYSTOLIC BLOOD PRESSURE: 133 MMHG | DIASTOLIC BLOOD PRESSURE: 75 MMHG | DIASTOLIC BLOOD PRESSURE: 65 MMHG | SYSTOLIC BLOOD PRESSURE: 156 MMHG | SYSTOLIC BLOOD PRESSURE: 132 MMHG | DIASTOLIC BLOOD PRESSURE: 82 MMHG | DIASTOLIC BLOOD PRESSURE: 81 MMHG | DIASTOLIC BLOOD PRESSURE: 64 MMHG | DIASTOLIC BLOOD PRESSURE: 82 MMHG | HEART RATE: 88 BPM | DIASTOLIC BLOOD PRESSURE: 75 MMHG | RESPIRATION RATE: 17 BRPM | DIASTOLIC BLOOD PRESSURE: 80 MMHG | OXYGEN SATURATION: 97 % | DIASTOLIC BLOOD PRESSURE: 67 MMHG | DIASTOLIC BLOOD PRESSURE: 89 MMHG | TEMPERATURE: 97.5 F | SYSTOLIC BLOOD PRESSURE: 132 MMHG | SYSTOLIC BLOOD PRESSURE: 156 MMHG | SYSTOLIC BLOOD PRESSURE: 156 MMHG | DIASTOLIC BLOOD PRESSURE: 65 MMHG | HEART RATE: 77 BPM | RESPIRATION RATE: 18 BRPM | DIASTOLIC BLOOD PRESSURE: 64 MMHG | DIASTOLIC BLOOD PRESSURE: 81 MMHG | SYSTOLIC BLOOD PRESSURE: 131 MMHG | TEMPERATURE: 97.5 F | OXYGEN SATURATION: 100 % | RESPIRATION RATE: 20 BRPM | HEART RATE: 84 BPM | HEART RATE: 88 BPM | OXYGEN SATURATION: 98 % | HEIGHT: 65 IN | DIASTOLIC BLOOD PRESSURE: 89 MMHG | WEIGHT: 260 LBS | DIASTOLIC BLOOD PRESSURE: 67 MMHG | SYSTOLIC BLOOD PRESSURE: 112 MMHG | TEMPERATURE: 97.4 F | RESPIRATION RATE: 17 BRPM | DIASTOLIC BLOOD PRESSURE: 82 MMHG | HEIGHT: 65 IN | RESPIRATION RATE: 16 BRPM | DIASTOLIC BLOOD PRESSURE: 67 MMHG | HEART RATE: 94 BPM | TEMPERATURE: 97.4 F | DIASTOLIC BLOOD PRESSURE: 65 MMHG | RESPIRATION RATE: 18 BRPM | TEMPERATURE: 97.5 F | HEART RATE: 78 BPM | HEART RATE: 88 BPM | HEART RATE: 75 BPM | DIASTOLIC BLOOD PRESSURE: 89 MMHG | RESPIRATION RATE: 16 BRPM | RESPIRATION RATE: 16 BRPM | OXYGEN SATURATION: 98 % | RESPIRATION RATE: 16 BRPM | RESPIRATION RATE: 33 BRPM | WEIGHT: 260 LBS | SYSTOLIC BLOOD PRESSURE: 108 MMHG | DIASTOLIC BLOOD PRESSURE: 82 MMHG | HEART RATE: 75 BPM | RESPIRATION RATE: 20 BRPM | SYSTOLIC BLOOD PRESSURE: 133 MMHG | SYSTOLIC BLOOD PRESSURE: 131 MMHG | DIASTOLIC BLOOD PRESSURE: 67 MMHG | DIASTOLIC BLOOD PRESSURE: 75 MMHG | HEART RATE: 80 BPM | OXYGEN SATURATION: 98 % | HEART RATE: 85 BPM | SYSTOLIC BLOOD PRESSURE: 108 MMHG | SYSTOLIC BLOOD PRESSURE: 127 MMHG | SYSTOLIC BLOOD PRESSURE: 131 MMHG | RESPIRATION RATE: 8 BRPM | RESPIRATION RATE: 33 BRPM | WEIGHT: 260 LBS | HEART RATE: 77 BPM | SYSTOLIC BLOOD PRESSURE: 108 MMHG | RESPIRATION RATE: 8 BRPM | HEART RATE: 94 BPM | HEIGHT: 65 IN | HEART RATE: 85 BPM | RESPIRATION RATE: 8 BRPM | TEMPERATURE: 97.4 F | WEIGHT: 260 LBS | RESPIRATION RATE: 33 BRPM | HEART RATE: 94 BPM | TEMPERATURE: 97.5 F | RESPIRATION RATE: 8 BRPM | SYSTOLIC BLOOD PRESSURE: 112 MMHG | HEART RATE: 94 BPM | DIASTOLIC BLOOD PRESSURE: 67 MMHG | OXYGEN SATURATION: 96 % | OXYGEN SATURATION: 97 % | OXYGEN SATURATION: 96 % | OXYGEN SATURATION: 100 % | SYSTOLIC BLOOD PRESSURE: 133 MMHG | HEART RATE: 75 BPM | DIASTOLIC BLOOD PRESSURE: 64 MMHG | HEART RATE: 77 BPM | HEART RATE: 78 BPM | DIASTOLIC BLOOD PRESSURE: 65 MMHG | SYSTOLIC BLOOD PRESSURE: 124 MMHG | HEART RATE: 88 BPM | DIASTOLIC BLOOD PRESSURE: 75 MMHG | DIASTOLIC BLOOD PRESSURE: 81 MMHG | DIASTOLIC BLOOD PRESSURE: 75 MMHG | WEIGHT: 260 LBS | TEMPERATURE: 97.4 F | SYSTOLIC BLOOD PRESSURE: 112 MMHG | OXYGEN SATURATION: 96 % | RESPIRATION RATE: 17 BRPM | OXYGEN SATURATION: 98 % | DIASTOLIC BLOOD PRESSURE: 89 MMHG | DIASTOLIC BLOOD PRESSURE: 67 MMHG | DIASTOLIC BLOOD PRESSURE: 89 MMHG | OXYGEN SATURATION: 96 % | SYSTOLIC BLOOD PRESSURE: 132 MMHG | HEART RATE: 78 BPM | RESPIRATION RATE: 16 BRPM | DIASTOLIC BLOOD PRESSURE: 75 MMHG | SYSTOLIC BLOOD PRESSURE: 133 MMHG | SYSTOLIC BLOOD PRESSURE: 132 MMHG | HEART RATE: 84 BPM | SYSTOLIC BLOOD PRESSURE: 108 MMHG | RESPIRATION RATE: 20 BRPM | DIASTOLIC BLOOD PRESSURE: 81 MMHG | SYSTOLIC BLOOD PRESSURE: 124 MMHG | RESPIRATION RATE: 18 BRPM | RESPIRATION RATE: 18 BRPM | DIASTOLIC BLOOD PRESSURE: 75 MMHG | SYSTOLIC BLOOD PRESSURE: 132 MMHG | SYSTOLIC BLOOD PRESSURE: 131 MMHG | HEART RATE: 77 BPM | SYSTOLIC BLOOD PRESSURE: 111 MMHG | WEIGHT: 260 LBS | OXYGEN SATURATION: 100 % | HEART RATE: 84 BPM | HEART RATE: 84 BPM | RESPIRATION RATE: 17 BRPM | SYSTOLIC BLOOD PRESSURE: 127 MMHG | DIASTOLIC BLOOD PRESSURE: 79 MMHG | HEIGHT: 65 IN | RESPIRATION RATE: 20 BRPM | OXYGEN SATURATION: 100 % | DIASTOLIC BLOOD PRESSURE: 64 MMHG | DIASTOLIC BLOOD PRESSURE: 67 MMHG | HEART RATE: 88 BPM | RESPIRATION RATE: 16 BRPM | HEART RATE: 85 BPM | DIASTOLIC BLOOD PRESSURE: 65 MMHG | SYSTOLIC BLOOD PRESSURE: 156 MMHG | DIASTOLIC BLOOD PRESSURE: 64 MMHG | HEIGHT: 65 IN | HEART RATE: 94 BPM | HEART RATE: 85 BPM | OXYGEN SATURATION: 97 % | RESPIRATION RATE: 16 BRPM | HEART RATE: 85 BPM | DIASTOLIC BLOOD PRESSURE: 80 MMHG | DIASTOLIC BLOOD PRESSURE: 80 MMHG | HEART RATE: 84 BPM | HEART RATE: 84 BPM | SYSTOLIC BLOOD PRESSURE: 111 MMHG | SYSTOLIC BLOOD PRESSURE: 131 MMHG | DIASTOLIC BLOOD PRESSURE: 64 MMHG | RESPIRATION RATE: 8 BRPM | TEMPERATURE: 97.4 F | DIASTOLIC BLOOD PRESSURE: 79 MMHG | DIASTOLIC BLOOD PRESSURE: 79 MMHG | HEART RATE: 75 BPM | SYSTOLIC BLOOD PRESSURE: 133 MMHG | HEIGHT: 65 IN | RESPIRATION RATE: 8 BRPM | SYSTOLIC BLOOD PRESSURE: 133 MMHG | OXYGEN SATURATION: 97 % | RESPIRATION RATE: 33 BRPM | HEART RATE: 94 BPM | DIASTOLIC BLOOD PRESSURE: 65 MMHG | SYSTOLIC BLOOD PRESSURE: 124 MMHG | DIASTOLIC BLOOD PRESSURE: 82 MMHG | TEMPERATURE: 97.4 F | SYSTOLIC BLOOD PRESSURE: 112 MMHG | SYSTOLIC BLOOD PRESSURE: 108 MMHG | SYSTOLIC BLOOD PRESSURE: 127 MMHG | DIASTOLIC BLOOD PRESSURE: 82 MMHG | OXYGEN SATURATION: 100 % | DIASTOLIC BLOOD PRESSURE: 79 MMHG | SYSTOLIC BLOOD PRESSURE: 156 MMHG | RESPIRATION RATE: 17 BRPM | SYSTOLIC BLOOD PRESSURE: 156 MMHG | OXYGEN SATURATION: 98 % | RESPIRATION RATE: 8 BRPM | OXYGEN SATURATION: 100 % | DIASTOLIC BLOOD PRESSURE: 80 MMHG | SYSTOLIC BLOOD PRESSURE: 124 MMHG | DIASTOLIC BLOOD PRESSURE: 79 MMHG | HEART RATE: 78 BPM | HEART RATE: 80 BPM | HEART RATE: 84 BPM | RESPIRATION RATE: 18 BRPM | OXYGEN SATURATION: 97 % | DIASTOLIC BLOOD PRESSURE: 81 MMHG | DIASTOLIC BLOOD PRESSURE: 81 MMHG | HEART RATE: 94 BPM | SYSTOLIC BLOOD PRESSURE: 111 MMHG | OXYGEN SATURATION: 97 % | HEART RATE: 88 BPM | OXYGEN SATURATION: 96 % | DIASTOLIC BLOOD PRESSURE: 79 MMHG | HEART RATE: 80 BPM | RESPIRATION RATE: 20 BRPM | TEMPERATURE: 97.5 F | HEART RATE: 80 BPM | SYSTOLIC BLOOD PRESSURE: 127 MMHG | SYSTOLIC BLOOD PRESSURE: 131 MMHG | SYSTOLIC BLOOD PRESSURE: 111 MMHG | DIASTOLIC BLOOD PRESSURE: 65 MMHG | DIASTOLIC BLOOD PRESSURE: 64 MMHG | OXYGEN SATURATION: 100 % | WEIGHT: 260 LBS | OXYGEN SATURATION: 97 % | RESPIRATION RATE: 17 BRPM | HEART RATE: 75 BPM | DIASTOLIC BLOOD PRESSURE: 89 MMHG | HEART RATE: 78 BPM | SYSTOLIC BLOOD PRESSURE: 108 MMHG | DIASTOLIC BLOOD PRESSURE: 82 MMHG | OXYGEN SATURATION: 98 % | SYSTOLIC BLOOD PRESSURE: 133 MMHG | SYSTOLIC BLOOD PRESSURE: 124 MMHG | SYSTOLIC BLOOD PRESSURE: 111 MMHG | DIASTOLIC BLOOD PRESSURE: 80 MMHG | HEART RATE: 75 BPM | HEART RATE: 85 BPM | SYSTOLIC BLOOD PRESSURE: 124 MMHG | HEART RATE: 88 BPM | RESPIRATION RATE: 33 BRPM | SYSTOLIC BLOOD PRESSURE: 112 MMHG | SYSTOLIC BLOOD PRESSURE: 112 MMHG | HEART RATE: 85 BPM | RESPIRATION RATE: 17 BRPM | HEART RATE: 78 BPM | OXYGEN SATURATION: 96 % | HEART RATE: 75 BPM | RESPIRATION RATE: 18 BRPM | TEMPERATURE: 97.4 F | SYSTOLIC BLOOD PRESSURE: 127 MMHG | RESPIRATION RATE: 20 BRPM | RESPIRATION RATE: 18 BRPM | TEMPERATURE: 97.5 F | SYSTOLIC BLOOD PRESSURE: 111 MMHG | OXYGEN SATURATION: 96 % | SYSTOLIC BLOOD PRESSURE: 132 MMHG | HEIGHT: 65 IN | SYSTOLIC BLOOD PRESSURE: 108 MMHG | DIASTOLIC BLOOD PRESSURE: 80 MMHG | SYSTOLIC BLOOD PRESSURE: 132 MMHG

## 2024-10-25 ENCOUNTER — OFFICE (AMBULATORY)
Dept: URBAN - METROPOLITAN AREA PATHOLOGY 4 | Facility: PATHOLOGY | Age: 44
End: 2024-10-25
Payer: COMMERCIAL

## 2024-10-25 ENCOUNTER — AMBULATORY SURGICAL CENTER (AMBULATORY)
Age: 44
End: 2024-10-25
Payer: COMMERCIAL

## 2024-10-25 ENCOUNTER — AMBULATORY SURGICAL CENTER (AMBULATORY)
Dept: URBAN - METROPOLITAN AREA SURGERY 17 | Facility: SURGERY | Age: 44
End: 2024-10-25
Payer: COMMERCIAL

## 2024-10-25 ENCOUNTER — OFFICE (AMBULATORY)
Age: 44
End: 2024-10-25

## 2024-10-25 DIAGNOSIS — K92.1 MELENA: ICD-10-CM

## 2024-10-25 DIAGNOSIS — R19.4 CHANGE IN BOWEL HABIT: ICD-10-CM

## 2024-10-25 LAB
GI HISTOLOGY: A. RANDOM RIGHT COLON: (no result)
GI HISTOLOGY: B. RANDOM LEFT COLON: (no result)
GI HISTOLOGY: PDF REPORT: (no result)

## 2024-10-25 PROCEDURE — 45380 COLONOSCOPY AND BIOPSY: CPT | Performed by: INTERNAL MEDICINE

## 2024-10-25 PROCEDURE — 88305 TISSUE EXAM BY PATHOLOGIST: CPT | Performed by: PATHOLOGY

## 2024-11-20 ENCOUNTER — OFFICE VISIT (OUTPATIENT)
Dept: UROLOGY | Age: 44
End: 2024-11-20
Payer: COMMERCIAL

## 2024-11-20 VITALS
DIASTOLIC BLOOD PRESSURE: 80 MMHG | HEIGHT: 65 IN | HEART RATE: 66 BPM | BODY MASS INDEX: 43.32 KG/M2 | WEIGHT: 260 LBS | SYSTOLIC BLOOD PRESSURE: 132 MMHG

## 2024-11-20 DIAGNOSIS — N32.81 OVERACTIVE BLADDER: Primary | ICD-10-CM

## 2024-11-20 LAB — URINE VOLUME: 0

## 2024-11-20 PROCEDURE — 51798 US URINE CAPACITY MEASURE: CPT | Performed by: NURSE PRACTITIONER

## 2024-11-20 PROCEDURE — 99213 OFFICE O/P EST LOW 20 MIN: CPT | Performed by: NURSE PRACTITIONER

## 2024-11-20 RX ORDER — TOLTERODINE 4 MG/1
4 CAPSULE, EXTENDED RELEASE ORAL DAILY
Qty: 90 CAPSULE | Refills: 4 | Status: SHIPPED | OUTPATIENT
Start: 2024-11-20

## 2024-11-20 RX ORDER — LOSARTAN POTASSIUM 25 MG/1
TABLET ORAL
COMMUNITY

## 2024-11-20 NOTE — PROGRESS NOTES
Chief Complaint: overactive bladder (F/u)    Subjective         History of Present Illness  Karishma Meade is a 44 y.o. female presents to [facility] to be seen for follow-up.    Patient was previously seen by me with last visit on 8/21/2024 for overactive bladder.  At that visit I increased her dose of tolterodine to 4 mg daily.  She is here for follow-up.     She reports that she is doing much better at this increased dosage.  She is only getting up 0-1 times per night.  Decreased frequency and urgency during the day.  She is very pleased with her treatment and outcome at this time.  She is requesting 90-day refills of the tolterodine.    Previous 8/21/2024:  Patient was previously seen by me with last visit on 7/17/2024 for overactive bladder.  I did start her on tolterodine at that visit.  She is here for follow-up.     She is seeing a little improvement in symptoms with tolterodine.  She does notice that it is worse when she is hydrating effectively.     Previous 7/17/2024:  Patient was previously seen by me with last visit on 6/5/2024 for overactive bladder and atrophic kidney.  She was started on oxybutynin at that visit.  She is here for follow-up.  We also discussed that she does not need any further workup on her atrophic kidney.     She did not see any difference with oxybutynin.         Previous 6/5/2024:  Patient was previously seen by me with last visit on 5/12/2023 for atrophic left kidney.  At that visit she was advised to follow-up in 1 year for follow-up ultrasound.  She is here for follow-up.     Denies any urinary tract infections since her last visit a year ago.     She reports she does have urinary frequency, urgency and occasional stress incontinence. She has never been treated for OAB.      US RENAL BILATERAL-     Date of Exam: 5/9/2024 4:01 PM     Indication: renal scarring, renal atrophy; N26.1-Atrophy of kidney  (terminal); N28.89-Other specified disorders of  kidney and ureter.     Comparison: 4/19/2023   Findings:  The left kidney measures 9.2 cm in length, with unchanged atrophic  appearance including increased echogenicity. The right kidney measures  12.5 cm in length. No hydronephrosis or nephrolithiasis. No suspicious  renal lesion.     Visualized portions of the urinary bladder are within normal limits.     IMPRESSION:  Impression:  Unchanged atrophic appearance of the left kidney. No hydronephrosis.           Electronically Signed By-Jorge Chaney MD On:5/9/2024 5:10 PM     Previous 5/12/2023:  Patient was previously seen by Dr. Dorothea Flores with last visit being 7/2/2020 for atrophic left kidney.     Patient reports that her original finding of atrophic kidney was found incidentally during a chest CT.      Denies recurrent UTI.      Frequency- admits- but reports she has recently increased her water intake     Urgency- admits     Incontinence- with urgency, some with cough/laugh/sneeze     Nocturia- 3-4     GH- denies      surgeries- denies     Family history of  malignancy- denies     Cardiopulmonary- HTN, irregular heartbeat     Anticoagulants- denies     Smoker- denies        Renal ultrasound:  INDICATIONS:  RENAL ATROPHY     FINDINGS:          The right kidney measures about 11.7 centimeters in length.  Left kidney measures 9.7 centimeters   in length.  Right kidney demonstrates no significant hydronephrosis.  No renal lesions are seen.    Cortical echogenicity appears to be normal.  Left kidney demonstrates no significant   hydronephrosis.  There is cortical thinning especially in the interpolar region compatible with   scarring.  No mass evident.  The exam is under penetrated.  Visualized bladder appears within   normal limits.     IMPRESSION:                 1. Redemonstration lobulated cortical thinning involving left kidney as seen on previous CT   6/24/2020 suggestive of scarring.  No acute findings are seen.  The exam is under penetrated in    this finding is better evaluated on previous CT.               FRANCISCA BELTRAN MD         Electronically Signed and Approved By: FRANCISCA BELTRAN MD on 4/19/2023 at 15:25           Previous 7/2/2020:  39-year-old lady presents for further evaluation of left atrophic kidney.  She previously sought evaluation by PCP for shortness of air and tachycardia for which chest CT was obtained.  Incidentally noted on CT chest was left renal atrophy with renal scarring.  Patient states she was unaware of size discrepancy of her kidneys.    Denies left-sided flank pain.  Denies history of pyelonephritis or urinary tract infection.  Denies having UTIs as a child.  Patient does report history of chronic back pain, but no flank pain.  Denies history of nephrolithiasis or hematuria.    Update 7/2/2020: Patient presents for follow-up after CT abdomen pelvis and MAG3 Lasix renal scan.  Patient denies significant changes since last visit.  Denies any flank pain or bothersome urinary symptoms.    creatinine  5/20/2020: 0.96  5/7/2020: 1.11    CT chest with contrast, 5/27/2020: Below the hemidiaphragms, there is chronic scarring involving left kidney.  The left kidney is also atrophic    CT abdomen pelvis, 6/24/2020: Moderate left renal cortical scarring.  Kidneys have symmetric enhancement and excretion no hydronephrosis.    MAG3 scan, 6/24/2020: Differential function left 40%: Right 60%; no evidence of obstructive uropathy      Left renal atrophy with moderate renal scarringCT imaging of MAG3 and reviewed and discussed with patient.  Despite left renal atrophy and scarring, it is a functioning kidney at 40%.  Has symmetric uptake of contrast on CT scan and is without evidence of obstructive uropathy.  No known etiology for the moderate renal scarring.  No acute intervention is warranted given these findings.     Obtain surveillance imaging via renal ultrasound in 1 year to assess for further atrophy    Follow-up 1 year with renal  "ultrasound prior       Objective     Past Medical History:   Diagnosis Date    Deep vein thrombosis     Hypertension        Past Surgical History:   Procedure Laterality Date     SECTION      TUBAL ABDOMINAL LIGATION           Current Outpatient Medications:     DULoxetine (CYMBALTA) 30 MG capsule, Take 1 capsule by mouth Daily., Disp: , Rfl:     lisinopril (PRINIVIL,ZESTRIL) 5 MG tablet, Take 1 tablet by mouth Daily., Disp: 90 tablet, Rfl: 5    losartan (COZAAR) 25 MG tablet, 0, Disp: , Rfl:     metoprolol succinate XL (TOPROL-XL) 50 MG 24 hr tablet, TAKE 1 TABLET BY MOUTH TWICE DAILY, Disp: 180 tablet, Rfl: 3    pantoprazole (PROTONIX) 40 MG EC tablet, Take 1 tablet by mouth Daily., Disp: , Rfl:     tolterodine LA (DETROL LA) 4 MG 24 hr capsule, Take 1 capsule by mouth Daily., Disp: 90 capsule, Rfl: 4    No Known Allergies     Family History   Problem Relation Age of Onset    Thyroid disease Mother     Thyroid disease Father     Diabetes Father     Anemia Father     Heart disease Paternal Grandmother     Cancer Paternal Grandmother     Heart disease Paternal Grandfather     Cancer Paternal Grandfather        Social History     Socioeconomic History    Marital status:    Tobacco Use    Smoking status: Former     Current packs/day: 0.00     Average packs/day: 0.5 packs/day for 1 year (0.5 ttl pk-yrs)     Types: Cigarettes     Start date: 2001     Quit date: 2001     Years since quittin.0     Passive exposure: Past    Smokeless tobacco: Never   Vaping Use    Vaping status: Never Used   Substance and Sexual Activity    Alcohol use: Not Currently     Alcohol/week: 1.0 standard drink of alcohol     Types: 1 Glasses of wine per week     Comment: occassionally    Drug use: Never    Sexual activity: Yes     Partners: Male     Birth control/protection: Tubal ligation       Vital Signs:   /80   Pulse 66   Ht 165.1 cm (65\")   Wt 118 kg (260 lb)   BMI 43.27 kg/m²      Physical " Exam  Vitals reviewed.   Constitutional:       Appearance: Normal appearance.   Neurological:      General: No focal deficit present.      Mental Status: She is alert and oriented to person, place, and time.   Psychiatric:         Mood and Affect: Mood normal.         Behavior: Behavior normal.          Result Review :   The following data was reviewed by: ABUNDIO Bonilla on 11/20/2024:  Results for orders placed or performed in visit on 11/20/24   Bladder Scan    Collection Time: 11/20/24  2:06 PM   Result Value Ref Range    Urine Volume 0       Bladder Scan interpretation 11/20/2024    Estimation of residual urine via "[employer]I 3000 Verathon Bladder Scan  MA/nurse performing: PETE Napoles  Residual Urine: 0 ml  Indication: Overactive bladder   Position: Supine  Examination: Incremental scanning of the suprapubic area using 2.0 MHz transducer using copious amounts of acoustic gel.   Findings: An anechoic area was demonstrated which represented the bladder, with measurement of residual urine as noted. I inspected this myself. In that the residual urine was stable or insignificant, refer to plan for treatment and plan necessary at this time.           Procedures        Assessment and Plan    Diagnoses and all orders for this visit:    1. Overactive bladder (Primary)  -     Bladder Scan  -     tolterodine LA (DETROL LA) 4 MG 24 hr capsule; Take 1 capsule by mouth Daily.  Dispense: 90 capsule; Refill: 4    The patient is doing very well on tolterodine currently.  Will continue current treatment plan.  Will plan to see the patient back in the office in 6 months to follow-up or sooner if needed or for any new concerns.      Follow Up   Return in about 6 months (around 5/20/2025).  Patient was given instructions and counseling regarding her condition or for health maintenance advice. Please see specific information pulled into the AVS if appropriate.         This document has been electronically signed by Beatriz  Nusrat Solorio, APRN  November 20, 2024 14:16 EST

## 2024-12-27 NOTE — PROGRESS NOTES
[facility]  Cardiology progress Note    Patient Name: Karishma Meade  : 1980    CHIEF COMPLAINT  Palpitations        Subjective   Subjective     HISTORY OF PRESENT ILLNESS    Karishma Meade is a 44 y.o. female with history of palpitations and hypertension.  No further palpitations.    REVIEW OF SYSTEMS    Constitutional:    No fever, no weight loss  Skin:     No rash  Otolaryngeal:    No difficulty swallowing  Cardiovascular: See HPI.  Pulmonary:    No cough, no sputum production    Personal History     Social History:    reports that she quit smoking about 23 years ago. Her smoking use included cigarettes. She started smoking about 24 years ago. She has a 0.5 pack-year smoking history. She has been exposed to tobacco smoke. She has never used smokeless tobacco. She reports that she does not currently use alcohol after a past usage of about 1.0 standard drink of alcohol per week. She reports that she does not use drugs.    Home Medications:  Current Outpatient Medications on File Prior to Visit   Medication Sig    DULoxetine (CYMBALTA) 30 MG capsule Take 1 capsule by mouth Daily.    pantoprazole (PROTONIX) 40 MG EC tablet Take 1 tablet by mouth Daily.    tolterodine LA (DETROL LA) 4 MG 24 hr capsule Take 1 capsule by mouth Daily.    [DISCONTINUED] losartan (COZAAR) 25 MG tablet 0    [DISCONTINUED] metoprolol succinate XL (TOPROL-XL) 50 MG 24 hr tablet TAKE 1 TABLET BY MOUTH TWICE DAILY    [DISCONTINUED] lisinopril (PRINIVIL,ZESTRIL) 5 MG tablet Take 1 tablet by mouth Daily. (Patient not taking: Reported on 2025)     No current facility-administered medications on file prior to visit.       Past Medical History:   Diagnosis Date    Deep vein thrombosis     Hypertension        Allergies:  No Known Allergies    Objective    Objective       Vitals:   Heart Rate:  [92] 92  BP: (144)/(93) 144/93  Body mass index is 45.4 kg/m².     PHYSICAL EXAM:    General Appearance:   well developed  well  nourished  HENT:   oropharynx moist  lips not cyanotic  Neck:  thyroid not enlarged  supple  Respiratory:  no respiratory distress  normal breath sounds  no rales  Cardiovascular:  no jugular venous distention  regular rhythm  apical impulse normal  S1 normal, S2 normal  no S3, no S4   no murmur  no rub, no thrill  carotid pulses normal; no bruit  pedal pulses normal  lower extremity edema: none    Skin:   warm, dry  Psychiatric:  judgement and insight appropriate  normal mood and affect        Result Review:  I have personally reviewed the available results from  [x]  Laboratory  [x]  EKG  [x]  Cardiology  [x]  Medications  [x]  Old records  []  Other:     Procedures    Results for orders placed during the hospital encounter of 02/13/24    Adult Transthoracic Echo Complete W/ Cont if Necessary Per Protocol    Interpretation Summary  Normal left ventricular systolic function.  No significant valve abnormalities noted.     Impression/Plan:  1.  Essential hypertension controlled: Continue losartan 25 mg once a day.  Monitor blood pressure regularly.  2.  Palpitations/tachycardia: Continue Toprol-XL 50 mg twice a day.  No further palpitations           Christian Ma MD   01/07/25   13:24 EST

## 2025-01-07 ENCOUNTER — OFFICE VISIT (OUTPATIENT)
Dept: CARDIOLOGY | Facility: CLINIC | Age: 45
End: 2025-01-07
Payer: COMMERCIAL

## 2025-01-07 VITALS
DIASTOLIC BLOOD PRESSURE: 93 MMHG | SYSTOLIC BLOOD PRESSURE: 144 MMHG | WEIGHT: 272.8 LBS | HEIGHT: 65 IN | OXYGEN SATURATION: 100 % | BODY MASS INDEX: 45.45 KG/M2 | HEART RATE: 92 BPM

## 2025-01-07 DIAGNOSIS — I10 HYPERTENSION, ESSENTIAL: Primary | ICD-10-CM

## 2025-01-07 DIAGNOSIS — R00.2 PALPITATIONS: ICD-10-CM

## 2025-01-07 PROCEDURE — 99214 OFFICE O/P EST MOD 30 MIN: CPT | Performed by: SPECIALIST

## 2025-01-07 RX ORDER — LOSARTAN POTASSIUM 25 MG/1
25 TABLET ORAL DAILY
Qty: 90 TABLET | Refills: 5 | Status: SHIPPED | OUTPATIENT
Start: 2025-01-07

## 2025-01-07 RX ORDER — METOPROLOL SUCCINATE 50 MG/1
50 TABLET, EXTENDED RELEASE ORAL 2 TIMES DAILY
Qty: 180 TABLET | Refills: 3 | Status: SHIPPED | OUTPATIENT
Start: 2025-01-07

## 2025-01-09 DIAGNOSIS — N32.81 OVERACTIVE BLADDER: ICD-10-CM

## 2025-01-09 RX ORDER — TOLTERODINE 4 MG/1
4 CAPSULE, EXTENDED RELEASE ORAL DAILY
Qty: 90 CAPSULE | Refills: 1 | Status: SHIPPED | OUTPATIENT
Start: 2025-01-09

## 2025-03-11 ENCOUNTER — TRANSCRIBE ORDERS (OUTPATIENT)
Dept: ADMINISTRATIVE | Facility: HOSPITAL | Age: 45
End: 2025-03-11
Payer: COMMERCIAL

## 2025-03-11 DIAGNOSIS — Z12.31 BREAST CANCER SCREENING BY MAMMOGRAM: Primary | ICD-10-CM

## 2025-04-21 ENCOUNTER — HOSPITAL ENCOUNTER (OUTPATIENT)
Dept: MAMMOGRAPHY | Facility: HOSPITAL | Age: 45
Discharge: HOME | End: 2025-04-21
Admitting: FAMILY MEDICINE
Payer: COMMERCIAL

## 2025-04-21 DIAGNOSIS — Z12.31 BREAST CANCER SCREENING BY MAMMOGRAM: ICD-10-CM

## 2025-04-21 PROCEDURE — 77063 BREAST TOMOSYNTHESIS BI: CPT

## 2025-04-21 PROCEDURE — 77067 SCR MAMMO BI INCL CAD: CPT

## 2025-05-14 ENCOUNTER — HOSPITAL ENCOUNTER (EMERGENCY)
Facility: HOSPITAL | Age: 45
Discharge: ANOTHER HOSPITAL | End: 2025-05-14
Attending: EMERGENCY MEDICINE | Admitting: EMERGENCY MEDICINE
Payer: COMMERCIAL

## 2025-05-14 ENCOUNTER — APPOINTMENT (OUTPATIENT)
Dept: GENERAL RADIOLOGY | Facility: HOSPITAL | Age: 45
End: 2025-05-14
Payer: COMMERCIAL

## 2025-05-14 VITALS
WEIGHT: 273.15 LBS | DIASTOLIC BLOOD PRESSURE: 79 MMHG | RESPIRATION RATE: 17 BRPM | TEMPERATURE: 98 F | HEART RATE: 82 BPM | BODY MASS INDEX: 45.51 KG/M2 | HEIGHT: 65 IN | SYSTOLIC BLOOD PRESSURE: 120 MMHG | OXYGEN SATURATION: 91 %

## 2025-05-14 DIAGNOSIS — S32.020G CLOSED COMPRESSION FRACTURE OF L2 LUMBAR VERTEBRA WITH DELAYED HEALING, SUBSEQUENT ENCOUNTER: ICD-10-CM

## 2025-05-14 DIAGNOSIS — S82.202B TYPE I OR II OPEN FRACTURE OF LEFT TIBIA AND FIBULA, INITIAL ENCOUNTER: Primary | ICD-10-CM

## 2025-05-14 DIAGNOSIS — S82.402B TYPE I OR II OPEN FRACTURE OF LEFT TIBIA AND FIBULA, INITIAL ENCOUNTER: Primary | ICD-10-CM

## 2025-05-14 LAB
ALBUMIN SERPL-MCNC: 4 G/DL (ref 3.5–5.2)
ALBUMIN/GLOB SERPL: 1.3 G/DL
ALP SERPL-CCNC: 84 U/L (ref 39–117)
ALT SERPL W P-5'-P-CCNC: 31 U/L (ref 1–33)
ANION GAP SERPL CALCULATED.3IONS-SCNC: 8.2 MMOL/L (ref 5–15)
AST SERPL-CCNC: 56 U/L (ref 1–32)
BASOPHILS # BLD AUTO: 0.06 10*3/MM3 (ref 0–0.2)
BASOPHILS NFR BLD AUTO: 0.4 % (ref 0–1.5)
BILIRUB SERPL-MCNC: 0.8 MG/DL (ref 0–1.2)
BUN SERPL-MCNC: 16 MG/DL (ref 6–20)
BUN/CREAT SERPL: 15.4 (ref 7–25)
CALCIUM SPEC-SCNC: 8.6 MG/DL (ref 8.6–10.5)
CHLORIDE SERPL-SCNC: 105 MMOL/L (ref 98–107)
CO2 SERPL-SCNC: 23.8 MMOL/L (ref 22–29)
CREAT SERPL-MCNC: 1.04 MG/DL (ref 0.57–1)
DEPRECATED RDW RBC AUTO: 42.3 FL (ref 37–54)
EGFRCR SERPLBLD CKD-EPI 2021: 68.5 ML/MIN/1.73
EOSINOPHIL # BLD AUTO: 0.07 10*3/MM3 (ref 0–0.4)
EOSINOPHIL NFR BLD AUTO: 0.5 % (ref 0.3–6.2)
ERYTHROCYTE [DISTWIDTH] IN BLOOD BY AUTOMATED COUNT: 12.8 % (ref 12.3–15.4)
GLOBULIN UR ELPH-MCNC: 3 GM/DL
GLUCOSE SERPL-MCNC: 128 MG/DL (ref 65–99)
HCT VFR BLD AUTO: 40.5 % (ref 34–46.6)
HGB BLD-MCNC: 13.6 G/DL (ref 12–15.9)
HOLD SPECIMEN: NORMAL
IMM GRANULOCYTES # BLD AUTO: 0.14 10*3/MM3 (ref 0–0.05)
IMM GRANULOCYTES NFR BLD AUTO: 0.9 % (ref 0–0.5)
LIPASE SERPL-CCNC: 17 U/L (ref 13–60)
LYMPHOCYTES # BLD AUTO: 0.71 10*3/MM3 (ref 0.7–3.1)
LYMPHOCYTES NFR BLD AUTO: 4.6 % (ref 19.6–45.3)
MCH RBC QN AUTO: 30.4 PG (ref 26.6–33)
MCHC RBC AUTO-ENTMCNC: 33.6 G/DL (ref 31.5–35.7)
MCV RBC AUTO: 90.6 FL (ref 79–97)
MONOCYTES # BLD AUTO: 0.77 10*3/MM3 (ref 0.1–0.9)
MONOCYTES NFR BLD AUTO: 5 % (ref 5–12)
NEUTROPHILS NFR BLD AUTO: 13.54 10*3/MM3 (ref 1.7–7)
NEUTROPHILS NFR BLD AUTO: 88.6 % (ref 42.7–76)
NRBC BLD AUTO-RTO: 0 /100 WBC (ref 0–0.2)
PLATELET # BLD AUTO: 269 10*3/MM3 (ref 140–450)
PMV BLD AUTO: 9.4 FL (ref 6–12)
POTASSIUM SERPL-SCNC: 5.1 MMOL/L (ref 3.5–5.2)
PROT SERPL-MCNC: 7 G/DL (ref 6–8.5)
RBC # BLD AUTO: 4.47 10*6/MM3 (ref 3.77–5.28)
SODIUM SERPL-SCNC: 137 MMOL/L (ref 136–145)
WBC NRBC COR # BLD AUTO: 15.29 10*3/MM3 (ref 3.4–10.8)
WHOLE BLOOD HOLD COAG: NORMAL

## 2025-05-14 PROCEDURE — 73070 X-RAY EXAM OF ELBOW: CPT

## 2025-05-14 PROCEDURE — 25010000002 CEFAZOLIN PER 500 MG: Performed by: EMERGENCY MEDICINE

## 2025-05-14 PROCEDURE — 72100 X-RAY EXAM L-S SPINE 2/3 VWS: CPT

## 2025-05-14 PROCEDURE — 96375 TX/PRO/DX INJ NEW DRUG ADDON: CPT

## 2025-05-14 PROCEDURE — 83690 ASSAY OF LIPASE: CPT | Performed by: EMERGENCY MEDICINE

## 2025-05-14 PROCEDURE — 73610 X-RAY EXAM OF ANKLE: CPT

## 2025-05-14 PROCEDURE — 73060 X-RAY EXAM OF HUMERUS: CPT

## 2025-05-14 PROCEDURE — 25010000002 ONDANSETRON PER 1 MG: Performed by: EMERGENCY MEDICINE

## 2025-05-14 PROCEDURE — 96376 TX/PRO/DX INJ SAME DRUG ADON: CPT

## 2025-05-14 PROCEDURE — 80053 COMPREHEN METABOLIC PANEL: CPT | Performed by: EMERGENCY MEDICINE

## 2025-05-14 PROCEDURE — 96365 THER/PROPH/DIAG IV INF INIT: CPT

## 2025-05-14 PROCEDURE — 99285 EMERGENCY DEPT VISIT HI MDM: CPT

## 2025-05-14 PROCEDURE — 36415 COLL VENOUS BLD VENIPUNCTURE: CPT

## 2025-05-14 PROCEDURE — 72072 X-RAY EXAM THORAC SPINE 3VWS: CPT

## 2025-05-14 PROCEDURE — 25010000002 HYDROMORPHONE 1 MG/ML SOLUTION: Performed by: EMERGENCY MEDICINE

## 2025-05-14 PROCEDURE — 85025 COMPLETE CBC W/AUTO DIFF WBC: CPT | Performed by: EMERGENCY MEDICINE

## 2025-05-14 RX ORDER — ONDANSETRON 2 MG/ML
4 INJECTION INTRAMUSCULAR; INTRAVENOUS ONCE
Status: COMPLETED | OUTPATIENT
Start: 2025-05-14 | End: 2025-05-14

## 2025-05-14 RX ORDER — SODIUM CHLORIDE 0.9 % (FLUSH) 0.9 %
10 SYRINGE (ML) INJECTION AS NEEDED
Status: DISCONTINUED | OUTPATIENT
Start: 2025-05-14 | End: 2025-05-14 | Stop reason: HOSPADM

## 2025-05-14 RX ORDER — SODIUM CHLORIDE 9 MG/ML
250 INJECTION, SOLUTION INTRAVENOUS CONTINUOUS
Status: DISCONTINUED | OUTPATIENT
Start: 2025-05-14 | End: 2025-05-14

## 2025-05-14 RX ADMIN — HYDROMORPHONE HYDROCHLORIDE 0.5 MG: 1 INJECTION, SOLUTION INTRAMUSCULAR; INTRAVENOUS; SUBCUTANEOUS at 11:34

## 2025-05-14 RX ADMIN — HYDROMORPHONE HYDROCHLORIDE 0.5 MG: 1 INJECTION, SOLUTION INTRAMUSCULAR; INTRAVENOUS; SUBCUTANEOUS at 10:09

## 2025-05-14 RX ADMIN — ONDANSETRON 4 MG: 2 INJECTION INTRAMUSCULAR; INTRAVENOUS at 10:09

## 2025-05-14 RX ADMIN — CEFAZOLIN 1000 MG: 1 INJECTION, POWDER, FOR SOLUTION INTRAMUSCULAR; INTRAVENOUS at 11:34

## 2025-06-13 ENCOUNTER — OFFICE VISIT (OUTPATIENT)
Dept: UROLOGY | Age: 45
End: 2025-06-13
Payer: COMMERCIAL

## 2025-06-13 VITALS — WEIGHT: 273.37 LBS | BODY MASS INDEX: 45.55 KG/M2 | RESPIRATION RATE: 14 BRPM | HEIGHT: 65 IN

## 2025-06-13 DIAGNOSIS — N32.81 OVERACTIVE BLADDER: Primary | ICD-10-CM

## 2025-06-13 LAB — URINE VOLUME: 0

## 2025-06-13 RX ORDER — TOLTERODINE 4 MG/1
4 CAPSULE, EXTENDED RELEASE ORAL DAILY
Qty: 90 CAPSULE | Refills: 4 | Status: SHIPPED | OUTPATIENT
Start: 2025-06-13

## 2025-06-13 RX ORDER — DICLOFENAC SODIUM 75 MG/1
75 TABLET, DELAYED RELEASE ORAL 2 TIMES DAILY
COMMUNITY
Start: 2025-03-11

## 2025-06-13 RX ORDER — HYDROCODONE BITARTRATE AND ACETAMINOPHEN 5; 325 MG/1; MG/1
1 TABLET ORAL
COMMUNITY
Start: 2025-05-15
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
